# Patient Record
Sex: FEMALE | Race: WHITE | Employment: OTHER | URBAN - METROPOLITAN AREA
[De-identification: names, ages, dates, MRNs, and addresses within clinical notes are randomized per-mention and may not be internally consistent; named-entity substitution may affect disease eponyms.]

---

## 2017-10-23 ENCOUNTER — TELEPHONE (OUTPATIENT)
Dept: PULMONOLOGY | Age: 62
End: 2017-10-23

## 2018-03-22 ENCOUNTER — OFFICE VISIT (OUTPATIENT)
Dept: ORTHOPEDIC SURGERY | Age: 63
End: 2018-03-22

## 2018-03-22 VITALS — WEIGHT: 220 LBS | HEIGHT: 63 IN | BODY MASS INDEX: 38.98 KG/M2

## 2018-03-22 DIAGNOSIS — M17.11 PRIMARY OSTEOARTHRITIS OF RIGHT KNEE: Primary | ICD-10-CM

## 2018-03-22 PROCEDURE — 3017F COLORECTAL CA SCREEN DOC REV: CPT | Performed by: ORTHOPAEDIC SURGERY

## 2018-03-22 PROCEDURE — G8484 FLU IMMUNIZE NO ADMIN: HCPCS | Performed by: ORTHOPAEDIC SURGERY

## 2018-03-22 PROCEDURE — 3014F SCREEN MAMMO DOC REV: CPT | Performed by: ORTHOPAEDIC SURGERY

## 2018-03-22 PROCEDURE — G8417 CALC BMI ABV UP PARAM F/U: HCPCS | Performed by: ORTHOPAEDIC SURGERY

## 2018-03-22 PROCEDURE — G8427 DOCREV CUR MEDS BY ELIG CLIN: HCPCS | Performed by: ORTHOPAEDIC SURGERY

## 2018-03-22 PROCEDURE — 99243 OFF/OP CNSLTJ NEW/EST LOW 30: CPT | Performed by: ORTHOPAEDIC SURGERY

## 2018-03-22 NOTE — LETTER
that can be easily folded. Sleeves and pant legs should be loose enough to fit over bandages if necessary. ? Remove all make-up and your contact lenses, if you wear them. ? If applicable bring a protective case for contact lenses/eyeglasses, dentures and hearing aids. Insurance Information:     ? Our precert department will get authorization for your surgery if one is required. ? Please make sure we are notified of any insurance changes prior to your surgery. ? It is YOUR responsibility to know what your benefits are and whether a co-pay is required. It is recommended that you verify your benefits with your insurance company prior to surgery. Chase Hollingsworth           YOUR APPOINTMENT FOLLOWING SURGERY WILL BE:                             [facility]        [facility]      FOR         5/2/18  @ 11:40 AM                                  PHYSICAL THERAPY APPT:_________Antionette Carter____    *If you have any questions please call Galindo Lawrence [phone]

## 2018-03-22 NOTE — PROGRESS NOTES
therapy, NSAIDs, bracing, and activity modification were discussed. 2.  The indications for therapeutic injections were discussed. 3.  The indications for additional imaging studies were discussed. 4.  After considering the various options discussed, the patient elected to pursue a course that includes proceeding with a right total knee replacement      INFORMED CONSENT NOTE        We discussed the risks, benefits, and alternatives to the proposed procedure, as well as the necessity of other members of the healthcare team participating in the procedure. All questions were answered and the patient elected to proceed with the proposed procedure and signed the informed consent form.

## 2018-03-28 ENCOUNTER — HOSPITAL ENCOUNTER (OUTPATIENT)
Dept: OTHER | Age: 63
Discharge: HOME | End: 2018-03-28
Attending: ORTHOPAEDIC SURGERY | Admitting: ORTHOPAEDIC SURGERY

## 2018-03-28 LAB
ABO/RH: NORMAL
ANION GAP SERPL CALCULATED.3IONS-SCNC: 11 MMOL/L (ref 3–16)
ANTIBODY SCREEN: NORMAL
APTT: 27.4 SEC (ref 24.1–34.9)
BUN BLDV-MCNC: 14 MG/DL (ref 7–20)
CALCIUM SERPL-MCNC: 9.3 MG/DL (ref 8.3–10.6)
CHLORIDE BLD-SCNC: 100 MMOL/L (ref 99–110)
CO2: 32 MMOL/L (ref 21–32)
CREAT SERPL-MCNC: 0.9 MG/DL (ref 0.6–1.2)
EKG ATRIAL RATE: 54 BPM
EKG DIAGNOSIS: NORMAL
EKG P AXIS: 59 DEGREES
EKG P-R INTERVAL: 156 MS
EKG Q-T INTERVAL: 428 MS
EKG QRS DURATION: 82 MS
EKG QTC CALCULATION (BAZETT): 405 MS
EKG R AXIS: 4 DEGREES
EKG T AXIS: 39 DEGREES
EKG VENTRICULAR RATE: 54 BPM
GFR AFRICAN AMERICAN: >60
GFR NON-AFRICAN AMERICAN: >60
GLUCOSE BLD-MCNC: 95 MG/DL (ref 70–99)
HCT VFR BLD CALC: 43.1 % (ref 36–48)
HEMOGLOBIN: 14.4 G/DL (ref 12–16)
INR BLD: 1.04 (ref 0.85–1.15)
MCH RBC QN AUTO: 30.1 PG (ref 26–34)
MCHC RBC AUTO-ENTMCNC: 33.4 G/DL (ref 31–36)
MCV RBC AUTO: 90.1 FL (ref 80–100)
PDW BLD-RTO: 15.7 % (ref 12.4–15.4)
PLATELET # BLD: 273 K/UL (ref 135–450)
PMV BLD AUTO: 8.2 FL (ref 5–10.5)
POTASSIUM SERPL-SCNC: 3.6 MMOL/L (ref 3.5–5.1)
PROTHROMBIN TIME: 11.8 SEC (ref 9.6–13)
RBC # BLD: 4.79 M/UL (ref 4–5.2)
SODIUM BLD-SCNC: 143 MMOL/L (ref 136–145)
WBC # BLD: 7.3 K/UL (ref 4–11)

## 2018-03-28 PROCEDURE — 93010 ELECTROCARDIOGRAM REPORT: CPT | Performed by: INTERNAL MEDICINE

## 2018-03-29 LAB — URINE CULTURE, ROUTINE: NORMAL

## 2018-04-12 ENCOUNTER — TELEPHONE (OUTPATIENT)
Dept: ORTHOPEDIC SURGERY | Age: 63
End: 2018-04-12

## 2018-04-26 DIAGNOSIS — Z96.651 S/P TOTAL KNEE REPLACEMENT, RIGHT: Primary | ICD-10-CM

## 2018-04-26 RX ORDER — OXYCODONE HYDROCHLORIDE AND ACETAMINOPHEN 5; 325 MG/1; MG/1
1 TABLET ORAL EVERY 6 HOURS PRN
Qty: 28 TABLET | Refills: 0 | Status: SHIPPED | OUTPATIENT
Start: 2018-04-26 | End: 2018-05-02 | Stop reason: SDUPTHER

## 2018-05-02 ENCOUNTER — OFFICE VISIT (OUTPATIENT)
Dept: ORTHOPEDIC SURGERY | Age: 63
End: 2018-05-02

## 2018-05-02 DIAGNOSIS — Z96.651 S/P TOTAL KNEE REPLACEMENT, RIGHT: ICD-10-CM

## 2018-05-02 DIAGNOSIS — Z96.651 STATUS POST TOTAL RIGHT KNEE REPLACEMENT: Primary | ICD-10-CM

## 2018-05-02 DIAGNOSIS — M17.11 PRIMARY OSTEOARTHRITIS OF RIGHT KNEE: ICD-10-CM

## 2018-05-02 PROCEDURE — 99024 POSTOP FOLLOW-UP VISIT: CPT | Performed by: ORTHOPAEDIC SURGERY

## 2018-05-02 RX ORDER — OXYCODONE HYDROCHLORIDE AND ACETAMINOPHEN 5; 325 MG/1; MG/1
1 TABLET ORAL EVERY 6 HOURS PRN
Qty: 28 TABLET | Refills: 0 | Status: SHIPPED | OUTPATIENT
Start: 2018-05-02 | End: 2018-05-09

## 2018-05-10 DIAGNOSIS — Z96.651 STATUS POST TOTAL RIGHT KNEE REPLACEMENT: Primary | ICD-10-CM

## 2018-05-30 ENCOUNTER — OFFICE VISIT (OUTPATIENT)
Dept: ORTHOPEDIC SURGERY | Age: 63
End: 2018-05-30

## 2018-05-30 DIAGNOSIS — M17.11 PRIMARY OSTEOARTHRITIS OF RIGHT KNEE: ICD-10-CM

## 2018-05-30 DIAGNOSIS — Z96.651 STATUS POST TOTAL RIGHT KNEE REPLACEMENT: Primary | ICD-10-CM

## 2018-05-30 PROCEDURE — 99024 POSTOP FOLLOW-UP VISIT: CPT | Performed by: ORTHOPAEDIC SURGERY

## 2018-06-20 ENCOUNTER — OFFICE VISIT (OUTPATIENT)
Dept: ORTHOPEDIC SURGERY | Age: 63
End: 2018-06-20

## 2018-06-20 VITALS
BODY MASS INDEX: 38.98 KG/M2 | HEIGHT: 63 IN | DIASTOLIC BLOOD PRESSURE: 80 MMHG | SYSTOLIC BLOOD PRESSURE: 123 MMHG | WEIGHT: 220 LBS | HEART RATE: 94 BPM

## 2018-06-20 DIAGNOSIS — R52 PAIN: ICD-10-CM

## 2018-06-20 DIAGNOSIS — M54.16 LUMBAR RADICULOPATHY: Primary | ICD-10-CM

## 2018-06-20 PROCEDURE — 1036F TOBACCO NON-USER: CPT | Performed by: PHYSICIAN ASSISTANT

## 2018-06-20 PROCEDURE — G8427 DOCREV CUR MEDS BY ELIG CLIN: HCPCS | Performed by: PHYSICIAN ASSISTANT

## 2018-06-20 PROCEDURE — G8417 CALC BMI ABV UP PARAM F/U: HCPCS | Performed by: PHYSICIAN ASSISTANT

## 2018-06-20 PROCEDURE — 99213 OFFICE O/P EST LOW 20 MIN: CPT | Performed by: PHYSICIAN ASSISTANT

## 2018-06-20 PROCEDURE — 3017F COLORECTAL CA SCREEN DOC REV: CPT | Performed by: PHYSICIAN ASSISTANT

## 2018-06-20 RX ORDER — METHYLPREDNISOLONE 4 MG/1
TABLET ORAL
Qty: 1 KIT | Refills: 0 | Status: SHIPPED | OUTPATIENT
Start: 2018-06-20 | End: 2018-06-26

## 2018-06-20 RX ORDER — PREDNISONE 10 MG/1
TABLET ORAL
Qty: 35 TABLET | Refills: 0 | Status: SHIPPED | OUTPATIENT
Start: 2018-06-20 | End: 2019-04-01 | Stop reason: ALTCHOICE

## 2018-07-02 ENCOUNTER — OFFICE VISIT (OUTPATIENT)
Dept: ORTHOPEDIC SURGERY | Age: 63
End: 2018-07-02

## 2018-07-02 VITALS
HEART RATE: 73 BPM | WEIGHT: 220 LBS | HEIGHT: 55 IN | SYSTOLIC BLOOD PRESSURE: 157 MMHG | DIASTOLIC BLOOD PRESSURE: 93 MMHG | BODY MASS INDEX: 50.91 KG/M2

## 2018-07-02 DIAGNOSIS — M51.36 DDD (DEGENERATIVE DISC DISEASE), LUMBAR: ICD-10-CM

## 2018-07-02 DIAGNOSIS — M51.26 HNP (HERNIATED NUCLEUS PULPOSUS), LUMBAR: Primary | ICD-10-CM

## 2018-07-02 PROCEDURE — G8417 CALC BMI ABV UP PARAM F/U: HCPCS | Performed by: PHYSICIAN ASSISTANT

## 2018-07-02 PROCEDURE — 99213 OFFICE O/P EST LOW 20 MIN: CPT | Performed by: PHYSICIAN ASSISTANT

## 2018-07-02 PROCEDURE — G8427 DOCREV CUR MEDS BY ELIG CLIN: HCPCS | Performed by: PHYSICIAN ASSISTANT

## 2018-07-02 PROCEDURE — 1036F TOBACCO NON-USER: CPT | Performed by: PHYSICIAN ASSISTANT

## 2018-07-02 PROCEDURE — 3017F COLORECTAL CA SCREEN DOC REV: CPT | Performed by: PHYSICIAN ASSISTANT

## 2018-07-02 RX ORDER — MELOXICAM 15 MG/1
TABLET ORAL
Qty: 30 TABLET | Refills: 1 | Status: ON HOLD | OUTPATIENT
Start: 2018-07-02 | End: 2019-05-08 | Stop reason: HOSPADM

## 2018-07-02 NOTE — PROGRESS NOTES
thirst, urination, heat/cold  RESPIRATORY: Denies current dyspnea, cough  GI: Denies nausea, vomiting, diarrhea   : Denies bowel or bladder issues       PHYSICAL EXAM:    Vitals: Blood pressure (!) 157/93, pulse 73, height (!) 5.3\" (0.135 m), weight 220 lb (99.8 kg), last menstrual period 05/05/2006. GENERAL EXAM:  · General Apparence: Patient is adequately groomed with no evidence of malnutrition. · Orientation: The patient is oriented to time, place and person. · Mood & Affect:The patient's mood and affect are appropriate   · Lymphatic: The lymphatic examination bilaterally reveals all areas to be without enlargement or induration  · Sensation: Sensation is intact without deficit    LUMBAR/SACRAL EXAMINATION:  · Inspection: Local inspection shows no step-off or bruising. Lumbar alignment is normal.  Sagittal and Coronal balance is neutral.      · Palpation:   No evidence of tenderness at the midline. No tenderness bilaterally at the paraspinal or trochanters. There is no step-off or paraspinal spasm. · Range of Motion: Intact flexion, moderate severely limited extension extension reproduces pain  · Strength:   Strength testing is 5/5 in all muscle groups tested. · Special Tests:   Straight leg raise and crossed SLR negative. Leg length and pelvis level.  0 out of 5 Pee's signs. · Skin: There are no rashes, ulcerations or lesions. · Reflexes: Reflexes are symmetrically 2+ at the patellar and ankle tendons; left patellar 1+. Clonus absent bilaterally at the feet. · Gait & station: Forward flexed with walker  · Additional Examinations: +tenderness to both SI joints  · RIGHT LOWER EXTREMITY: Inspection/examination of the right lower extremity does not show any tenderness, deformity or injury. Range of motion is full. There is no gross instability. There are no rashes, ulcerations or lesions.  Strength and tone are normal.  · LEFT LOWER EXTREMITY:  Inspection/examination of the left lower extremity does not show any tenderness, deformity or injury. Range of motion is full. There is no gross instability. There are no rashes, ulcerations or lesions. Strength and tone are normal.    Diagnostic Testing:    Lumbar MRI report & scan reviewed from [facility] 6/29/2018 showing L4 5 spondylolisthesis, large left foraminal extrusion L3 4, disc bulging with bilateral foraminal stenosis L5-S1      2 views of the left hip including AP pelvis and lateral and straight some moderate osteoarthritis of the hip without signs of any AVN or fractures. No pelvic abnormalities.         Impression:  1) 1mo LB/buttock pain, contributing sacroiliitis  2) Large left L3-4 foraminal extrusion, biforaminal stenosis L5-S1  3) Moderate left hip OA--Jagdish Dickinson PA-C  4) S/p B TKR  5) Sarcoidosis       Plan:   1) PT for above  2) Mobic 15mg I po qd PRN after steroid  3) Cont Gabapentin   4) F/u 4-6wks after PT, discussed BA if no improvement         [facility]

## 2018-08-01 ENCOUNTER — OFFICE VISIT (OUTPATIENT)
Dept: ORTHOPEDIC SURGERY | Age: 63
End: 2018-08-01

## 2018-08-01 VITALS
WEIGHT: 220 LBS | HEIGHT: 63 IN | HEART RATE: 58 BPM | SYSTOLIC BLOOD PRESSURE: 155 MMHG | BODY MASS INDEX: 38.98 KG/M2 | DIASTOLIC BLOOD PRESSURE: 96 MMHG

## 2018-08-01 DIAGNOSIS — M54.16 LUMBAR RADICULOPATHY: Primary | ICD-10-CM

## 2018-08-01 PROCEDURE — 3017F COLORECTAL CA SCREEN DOC REV: CPT | Performed by: PHYSICAL MEDICINE & REHABILITATION

## 2018-08-01 PROCEDURE — 1036F TOBACCO NON-USER: CPT | Performed by: PHYSICAL MEDICINE & REHABILITATION

## 2018-08-01 PROCEDURE — 99214 OFFICE O/P EST MOD 30 MIN: CPT | Performed by: PHYSICAL MEDICINE & REHABILITATION

## 2018-08-01 PROCEDURE — G8427 DOCREV CUR MEDS BY ELIG CLIN: HCPCS | Performed by: PHYSICAL MEDICINE & REHABILITATION

## 2018-08-01 PROCEDURE — G8417 CALC BMI ABV UP PARAM F/U: HCPCS | Performed by: PHYSICAL MEDICINE & REHABILITATION

## 2018-08-01 NOTE — LETTER
[address] [place] [address] [address] and Sports Medicine    Please Schedule the following with: Dr. Moisés Coley    Date:  08/01/18     Patient: Fuentes Wade     YOB: 1955    Patient Home Phone: [phone] (home)    Diagnosis: Left L3 4 foraminal HNP, I foraminal stenosis L5-S1    [x]LT     []RT     []EVIN     []Midline    Levels: Left L5-S1 interlaminar epidural, #1  []Cervical BA    []L-MBB  []SI Joint    []C-FACET  []L-FACET    []Interlaminar BA     []HIP     []C-MBB  []Transforaminal BA   []Neurotomy    Attending Physician: Gomez Watkins    Injection Schedule for: At: [facility]    First Insurance:                               Pre-cert #:  Second Insurance:                 Pre-cert #:    Comments:        [] Blood Thinner:                 []Diabetic           []Antibiotic:               []Glaucoma:    [] Current Open Wounds, Lacerations or Sores     Allergies: Allergies   Allergen Reactions    Plaquenil [Hydroxychloroquine Sulfate] Shortness Of Breath    Demerol Other (See Comments)     HALLUCINATE       Past Medical History:   Diagnosis Date    Anemia     Arthritis     [facility] [address]     Hypertension     Lung disease     Mediastinal lymphadenopathy     Sarcoidosis ([facility] [address]           Current Outpatient Prescriptions:     meloxicam (MOBIC) 15 MG tablet, I po qd PRN, Disp: 30 tablet, Rfl: 1    predniSONE (DELTASONE) 10 MG tablet, ONE TID FOR ONE WEEK , THEN ONE BID FOR ONE WEEK, THEN START MEDROL DOSE SHERITA, Disp: 35 tablet, Rfl: 0    aspirin 325 MG EC tablet, Take 1 tablet by mouth 2 times daily, Disp: 60 tablet, Rfl: 0    sertraline (ZOLOFT) 25 MG tablet, Take 25 mg by mouth daily, Disp: , Rfl:     gabapentin (NEURONTIN) 300 MG capsule, Take 300 mg by mouth 3 times daily, Disp: , Rfl:     hydrochlorothiazide (MICROZIDE) 12.5 MG capsule, Take 12.5 mg by mouth daily. , Disp: , Rfl:     predniSONE (DELTASONE) 10 MG tablet, Take 5 mg by mouth daily , Disp: , Rfl:     methotrexate 2.5 MG tablet, Take 1 tablet by mouth once a week. Take 4 tablets by mouth ounce weekly. , Disp: 48 tablet, Rfl: 3    folic acid (FOLVITE) 1 MG tablet, Take 1 tablet by mouth daily. , Disp: 90 tablet, Rfl: 3    omeprazole (PRILOSEC) 20 MG capsule, Take 20 mg by mouth.  Indications: pt states when needed, Disp: , Rfl:

## 2018-08-01 NOTE — PROGRESS NOTES
New Patient: SPINE    CHIEF COMPLAINT:    Chief Complaint   Patient presents with    Back Pain     fu back doing a little better, therapy helping       HISTORY OF PRESENT ILLNESS:                The patient is a 57 y.o. female h/o sarcoidosis, HTN referred by Wicho Ornelas PA-C for a 8 week h/o Atraumatic aching low back and left buttock pain    Now some relief after 6 visits of therapy but still pain in her back left buttock occasionally referred to her hips and numbness and tingling in the lateral calves bilaterally. Worse standing and walking short distances summer with sitting but incomplete. No progressive weakness    Pain Assessment  Location of Pain: Back  Severity of Pain: 5  Quality of Pain: Aching  Duration of Pain: Persistent  Frequency of Pain: Constant  Aggravating Factors: Standing, Walking  Limiting Behavior: Yes  Relieving Factors: Rest  Result of Injury: No  Work-Related Injury: No  Are there other pain locations you wish to document?: No    The pain assessment was noted & reviewed in the medical record today.      Current/Past Treatment:   · Physical Therapy: no  · Chiropractic:   no  · Injection:   no  · Medications: recent:  NSAIDs, Prednisone, MDP, Percocet, Gabapentin 300mg QID    · Surgery/Consult:  no    Past Medical History: Medical history form was reviewed today & scanned into the media tab  Past Medical History:   Diagnosis Date    Anemia     Arthritis     [facility]asis ([facility] [address]     Hypertension     Lung disease     Mediastinal lymphadenopathy     Sarcoidosis ([facility] [address]       Past Surgical History:     Past Surgical History:   Procedure Laterality Date    APPENDECTOMY      CARPAL TUNNEL RELEASE      JOINT REPLACEMENT      LEFT KNEE    OTHER SURGICAL HISTORY  04/16/2018    right total knee replacement    ROTATOR CUFF REPAIR      RIGHT     Current Medications:     Current Outpatient Prescriptions:     meloxicam (MOBIC) 15 MG tablet, I po qd PRN, Disp: 30 tablet, Rfl: 1    predniSONE PHYSICAL EXAM:    Vitals: Blood pressure (!) 155/96, pulse 58, height 5' 3\" (1.6 m), weight 220 lb (99.8 kg), last menstrual period 05/05/2006. GENERAL EXAM:  · General Apparence: Patient is adequately groomed with no evidence of malnutrition. · Orientation: The patient is oriented to time, place and person. · Mood & Affect:The patient's mood and affect are appropriate   · Lymphatic: The lymphatic examination bilaterally reveals all areas to be without enlargement or induration  · Sensation: Sensation is intact without deficit    LUMBAR/SACRAL EXAMINATION:  · Inspection: Local inspection shows no step-off or bruising. Lumbar alignment is normal.  Sagittal and Coronal balance is neutral.      · Palpation:   No evidence of tenderness at the midline. No tenderness bilaterally at the paraspinal or trochanters. There is no step-off or paraspinal spasm. · Range of Motion: Flexion to 30-40° extension 0°   · Strength:   Strength testing is 5/5 in all muscle groups tested. · Special Tests:   Straight leg raise and crossed SLR negative. Leg length and pelvis level.  0 out of 5 Pee's signs. · Skin: There are no rashes, ulcerations or lesions. · Reflexes: Reflexes are symmetrically trace at the lower extremities Gait & station: Forward flexed with walker  · Additional Examinations:   · RIGHT LOWER EXTREMITY: Inspection/examination of the right lower extremity does not show any tenderness, deformity or injury. Range of motion is full. There is no gross instability. There are no rashes, ulcerations or lesions. Strength and tone are normal.  · LEFT LOWER EXTREMITY:  Inspection/examination of the left lower extremity does not show any tenderness, deformity or injury. Range of motion is full. There is no gross instability. There are no rashes, ulcerations or lesions.   Strength and tone are normal.    Diagnostic Testing:    Lumbar MRI report & scan reviewed from [facility] 6/29/2018 showing L4 5 spondylolisthesis, large left foraminal extrusion L3 4, disc bulging with bilateral foraminal stenosis L5-S1      2 views of the left hip including AP pelvis and lateral and straight some moderate osteoarthritis of the hip without signs of any AVN or fractures. No pelvic abnormalities.         Impression:  1)2mo LB/buttock pain, contributing sacroiliitis  2) Large left L3-4 foraminal extrusion, biforaminal stenosis L5-S1  3) Moderate left hip OA--Jagdish Dickinson PA-C  4) S/p B TKR  5) Sarcoidosis       Plan:     D/c PT  Left L5-S1 interlaminar epidural, #1    Can consider more focal left L3 4 transforaminal not improved    She may be symptomatic from both the bi-foraminal stenosis L5-S1 as well as the disc herniation L3 4      [address]

## 2018-08-17 ENCOUNTER — TELEPHONE (OUTPATIENT)
Dept: ORTHOPEDIC SURGERY | Age: 63
End: 2018-08-17

## 2018-08-17 NOTE — TELEPHONE ENCOUNTER
DOS   8/24/18  CPT   58975  DX   M51.26   M47.817  OP SX AUTH   [id number]  VALID   8/6/18 - 10/6/18   PER   FAX  LEFT   LEVELS   L5 - S1   PROCEDURE   EPIDURAL INJECTION  DR. Keenan [facility]  INSURANCE:   [facility]

## 2018-08-24 ENCOUNTER — HOSPITAL ENCOUNTER (OUTPATIENT)
Age: 63
Setting detail: OUTPATIENT SURGERY
Discharge: HOME | End: 2018-08-24
Attending: PHYSICAL MEDICINE & REHABILITATION | Admitting: PHYSICAL MEDICINE & REHABILITATION
Payer: COMMERCIAL

## 2018-08-24 VITALS
WEIGHT: 220 LBS | HEART RATE: 59 BPM | BODY MASS INDEX: 37.56 KG/M2 | SYSTOLIC BLOOD PRESSURE: 160 MMHG | RESPIRATION RATE: 16 BRPM | OXYGEN SATURATION: 100 % | TEMPERATURE: 98 F | DIASTOLIC BLOOD PRESSURE: 85 MMHG | HEIGHT: 64 IN

## 2018-08-24 PROCEDURE — 7100000010 HC PHASE II RECOVERY - FIRST 15 MIN: Performed by: PHYSICAL MEDICINE & REHABILITATION

## 2018-08-24 PROCEDURE — 2709999900 HC NON-CHARGEABLE SUPPLY: Performed by: PHYSICAL MEDICINE & REHABILITATION

## 2018-08-24 PROCEDURE — 2500000003 HC RX 250 WO HCPCS: Performed by: PHYSICAL MEDICINE & REHABILITATION

## 2018-08-24 PROCEDURE — 7100000011 HC PHASE II RECOVERY - ADDTL 15 MIN: Performed by: PHYSICAL MEDICINE & REHABILITATION

## 2018-08-24 PROCEDURE — 3600000002 HC SURGERY LEVEL 2 BASE: Performed by: PHYSICAL MEDICINE & REHABILITATION

## 2018-08-24 PROCEDURE — 6360000004 HC RX CONTRAST MEDICATION: Performed by: PHYSICAL MEDICINE & REHABILITATION

## 2018-08-24 PROCEDURE — 6360000002 HC RX W HCPCS: Performed by: PHYSICAL MEDICINE & REHABILITATION

## 2018-08-24 ASSESSMENT — PAIN DESCRIPTION - DESCRIPTORS: DESCRIPTORS: NUMBNESS;TINGLING

## 2018-08-24 ASSESSMENT — PAIN - FUNCTIONAL ASSESSMENT: PAIN_FUNCTIONAL_ASSESSMENT: 0-10

## 2018-09-13 ENCOUNTER — OFFICE VISIT (OUTPATIENT)
Dept: ORTHOPEDIC SURGERY | Age: 63
End: 2018-09-13

## 2018-09-13 VITALS
BODY MASS INDEX: 50.91 KG/M2 | DIASTOLIC BLOOD PRESSURE: 84 MMHG | HEIGHT: 55 IN | SYSTOLIC BLOOD PRESSURE: 131 MMHG | WEIGHT: 220 LBS | HEART RATE: 86 BPM

## 2018-09-13 DIAGNOSIS — M51.26 HNP (HERNIATED NUCLEUS PULPOSUS), LUMBAR: ICD-10-CM

## 2018-09-13 DIAGNOSIS — M51.36 DDD (DEGENERATIVE DISC DISEASE), LUMBAR: ICD-10-CM

## 2018-09-13 DIAGNOSIS — M54.16 LUMBAR RADICULOPATHY: Primary | ICD-10-CM

## 2018-09-13 PROCEDURE — 1036F TOBACCO NON-USER: CPT | Performed by: PHYSICIAN ASSISTANT

## 2018-09-13 PROCEDURE — G8427 DOCREV CUR MEDS BY ELIG CLIN: HCPCS | Performed by: PHYSICIAN ASSISTANT

## 2018-09-13 PROCEDURE — 99213 OFFICE O/P EST LOW 20 MIN: CPT | Performed by: PHYSICIAN ASSISTANT

## 2018-09-13 PROCEDURE — 3017F COLORECTAL CA SCREEN DOC REV: CPT | Performed by: PHYSICIAN ASSISTANT

## 2018-09-13 PROCEDURE — G8417 CALC BMI ABV UP PARAM F/U: HCPCS | Performed by: PHYSICIAN ASSISTANT

## 2018-09-13 NOTE — PROGRESS NOTES
Follow up injection: SPINE    CHIEF COMPLAINT:    Chief Complaint   Patient presents with    Back Pain     F/U BA injection       HISTORY OF PRESENT ILLNESS:                The patient is a 57 y.o. female h/o sarcoidosis, HTN referred by Wicho Ornelas PA-C for now a 3.5mo h/o atraumatic aching low back and left buttock pain. At times she will experience numbness in her left lateral thigh/calf. Pain is most bothersome. Symptoms are increased with any prolonged walking or standing. Relief with sitting and forward flexion. She reports 50% improvement with recent BA. Other conservative care includes NSAIDs, prednisone, MDP, muscle relaxers, Percocet, gabapentin 300 QID, PT. Denies any progressive extremity weakness or recent bowel or bladder changes. No side effects the procedure    Pain Assessment  Location of Pain: Back  Severity of Pain: 6  Quality of Pain: Sharp, Dull, Aching  Duration of Pain: Persistent  Frequency of Pain: Constant  Aggravating Factors: Stairs, Walking, Standing, Kneeling, Squatting, Straightening, Exercise, Stretching, Bending  Limiting Behavior: Yes  Relieving Factors: Rest  Result of Injury: No  Work-Related Injury: No  Are there other pain locations you wish to document?: No    The pain assessment was noted & reviewed in the medical record today.      Current/Past Treatment:   · Physical Therapy: YES  · Chiropractic:   no  · Injection: 8/24/18 Left L5/S1 LESI #1--50%   · Medications: NSAIDs, Prednisone, MDP, Percocet, Gabapentin 300mg QID    · Surgery/Consult:  no    Past Medical History: Medical history form was reviewed today & scanned into the media tab  Past Medical History:   Diagnosis Date    Anemia     Arthritis     Bron[facility]ctasis ([facility] [address]     Hypertension     Lung disease     Mediastinal lymphadenopathy     Sarcoidosis       Past Surgical History:     Past Surgical History:   Procedure Laterality Date    APPENDECTOMY      CARPAL TUNNEL RELEASE      JOINT REPLACEMENT LEFT KNEE    OTHER SURGICAL HISTORY  04/16/2018    right total knee replacement    VT NJX DX/THER SBST INTRLMNR CRV/THRC W/IMG GDN Left 8/24/2018    LEFT LUMBAR FIVE SACRAL ONE EPIDURAL STEROID INJECTION SITE CONFIRMED BY FLUOROSCOPY performed by Dang Sommers MD at [facility]      RIGHT     Current Medications:     Current Outpatient Prescriptions:     meloxicam (MOBIC) 15 MG tablet, I po qd PRN, Disp: 30 tablet, Rfl: 1    predniSONE (DELTASONE) 10 MG tablet, ONE TID FOR ONE WEEK , THEN ONE BID FOR ONE WEEK, THEN START MEDROL DOSE SHERITA, Disp: 35 tablet, Rfl: 0    sertraline (ZOLOFT) 25 MG tablet, Take 25 mg by mouth daily, Disp: , Rfl:     gabapentin (NEURONTIN) 300 MG capsule, Take 300 mg by mouth 3 times daily, Disp: , Rfl:     hydrochlorothiazide (MICROZIDE) 12.5 MG capsule, Take 12.5 mg by mouth daily. , Disp: , Rfl:     predniSONE (DELTASONE) 10 MG tablet, Take 5 mg by mouth daily , Disp: , Rfl:     methotrexate 2.5 MG tablet, Take 1 tablet by mouth once a week. Take 4 tablets by mouth ounce weekly. , Disp: 48 tablet, Rfl: 3    folic acid (FOLVITE) 1 MG tablet, Take 1 tablet by mouth daily. , Disp: 90 tablet, Rfl: 3    omeprazole (PRILOSEC) 20 MG capsule, Take 20 mg by mouth. Indications: pt states when needed, Disp: , Rfl:     aspirin 325 MG EC tablet, Take 1 tablet by mouth 2 times daily, Disp: 60 tablet, Rfl: 0  Allergies:  Plaquenil [hydroxychloroquine sulfate] and Demerol  Social History:    reports that she has never smoked. She has never used smokeless tobacco. She reports that she does not drink alcohol or use drugs.   Family History:   Family History   Problem Relation Age of Onset    Cancer Mother         Lymphoma    Cancer Brother         Lymphoma    Asthma Neg Hx     Diabetes Neg Hx     Heart Failure Neg Hx     Emphysema Neg Hx     Hypertension Neg Hx        REVIEW OF SYSTEMS: Full ROS noted & scanned   CONSTITUTIONAL: Denies unexplained weight loss, fevers, chills or fatigue  NEUROLOGICAL: Denies unsteady gait or progressive weakness      PHYSICAL EXAM:    Vitals: Blood pressure 131/84, pulse 86, height (!) 5.3\" (0.135 m), weight 220 lb (99.8 kg), last menstrual period 05/05/2006. GENERAL EXAM:  · General Apparence: Patient is adequately groomed with no evidence of malnutrition. · Orientation: The patient is oriented to time, place and person. · Mood & Affect:The patient's mood and affect are appropriate   · Lymphatic: The lymphatic examination bilaterally reveals all areas to be without enlargement or induration  · Sensation: Sensation is intact without deficit    LUMBAR/SACRAL EXAMINATION:  · Inspection: Local inspection shows no step-off or bruising. Lumbar alignment is normal.  Sagittal and Coronal balance is neutral.      · Palpation:   No evidence of tenderness at the midline. No tenderness bilaterally at the paraspinal or trochanters. There is no step-off or paraspinal spasm. · Range of Motion: 45° of flexion, 10° extension  · Strength:   Strength testing is 5/5 in all muscle groups tested. · Special Tests:   Straight leg raise and crossed SLR negative. Leg length and pelvis level.  0 out of 5 Pee's signs. · Skin: There are no rashes, ulcerations or lesions. · Reflexes: Reflexes are symmetrically 2+ at the patellar and ankle tendons; left patellar 1+. Clonus absent bilaterally at the feet. · Gait & station: Forward flexed with walker  · Additional Examinations: +tenderness to L SI joint  · RIGHT LOWER EXTREMITY: Inspection/examination of the right lower extremity does not show any tenderness, deformity or injury. Range of motion is full. There is no gross instability. There are no rashes, ulcerations or lesions. Strength and tone are normal.  · LEFT LOWER EXTREMITY:  Inspection/examination of the left lower extremity does not show any tenderness, deformity or injury. Range of motion is full.  There is no gross

## 2019-03-30 NOTE — OP NOTE
Jean Bustillo    OPERATIVE NOTE    Preoperative Diagnosis: Cataract right eye    Postoperative Diagnosis: Cataract right eye    Procedure: Phacoemulsification with intraocular lens inplantation, right eye    Surgeon: Promise Hernandez M.D., Ph.D. Anesthesia: MAC with topical    Complications: none    Specimens: none    Indications for procedure: The patient is a 70y.o. year old with decreased vision, glare and halos around lights, and trouble with activities of daily living. Examination revealed a visually significant cataract in the right eye. Risks, benefits, and alternatives to surgery were discussed with the patient and the patient elected to proceed with phacoemulsification with lens implantation. Details of the procedure: Following informed consent, the patient was taken to the operating room and placed in the supine position. The eye was prepped and draped in the usual sterile fashion using aseptic technique for cataract surgery. Following topical tetracaine drops a side port incision was made in the superotemporal cornea. The eye was filled with Trypan blue followed by balanced salt solution. The eye was filled with 1% non-preserved lidocaine. The eye was filled with viscoelastic and a 2.2 mm keratome blade was used to make a 3-plane clear corneal incision in the temporal cornea. The cystitome was used to make a tear in the anterior capsule and a Utrata forceps was used to make a complete curvilinear capsulorrhexis. The lens was hydrodissected and freely rotated. Phacoemulsification was performed. Irrigation/aspiration was used to remove all cortical material from the capsular bag. The eye was filled with viscoelastic and a foldable posterior chamber intraocular lens was injected into the capsular bag. Irrigation/aspiration was used to remove all excess viscoelastic. The eye was pressurized and the wounds were check for leaks and none were found.   The patient had Betadine and Alphagan solutions placed on the eye. The eye was covered with a metal shield. The patient went to the PACU in excellent condition, having tolerated the procedure extremely well, and will follow up with me tomorrow for postop day 1 care.     Rl James MD, PhD, FACS

## 2019-03-30 NOTE — H&P
The H&P was reviewed, the patient was examined, and no change has occurred in the patient's condition since the H&P was completed.     Julissa Florian MD, PhD, FACS

## 2019-04-01 NOTE — PROGRESS NOTES
PRE OP INSTRUCTION SHEET   1. Do not eat or drink anything after 12 midnight  prior to surgery. This includes no water, chewing gum or mints. 2. Take the following pills will a small sip of water (see MAR)                                        3. Aspirin, Ibuprofen, Advil, Naproxen, Vitamin E, fish oil and other Anti-inflammatory products should be stopped for 5 days before surgery or as directed by your physician. 4. Check with your Doctor regarding stopping Plavix, Coumadin, Lovenox, Fragmin or other blood thinners   5. Do not smoke, and do not drink any alcoholic beverages 24 hours prior to surgery. This includes NA Beer. 6. You may brush your teeth and gargle the morning of surgery. DO NOT SWALLOW WATER   7. You MUST make arrangements for a responsible adult to take you home after your surgery. You will not be allowed to leave alone or drive yourself home. It is strongly suggested someone stay with you the first 24 hrs. Your surgery will be cancelled if you do not have a ride home. 8. A parent/legal guardian must accompany a child scheduled for surgery and plan to stay at the hospital until the child is discharged. Please do not bring other children with you. 9. Please wear simple, loose fitting clothing to the hospital.  Lencho Cervantes not bring valuables (money, credit cards, checkbooks, etc.) Do not wear any makeup (including no eye makeup) or nail polish on your fingers or toes. 10. DO NOT wear any jewelry or piercings on day of surgery. All body piercing jewelry must be removed. 11. If you have dentures,glasses, or contacts they will be removed before going to the OR; we will provide you a container. 12. Please see your family doctor/and cardiologist for a history & physical and/or concerning medications. Bring any test results/reports from your physician's office. Have history and labs faxed to [phone].  Remember to bring Blood Bank bracelet on the day of surgery. 14. If you have a Living Will and Durable Power of  for Healthcare, please bring in a copy. 13. Notify your Surgeon if you develop any illness between now and surgery  time, cough, cold, fever, sore throat, nausea, vomiting, etc.  Please notify your surgeon if you experience dizziness, shortness of breath or blurred vision between now & the time of your surgery   16. DO NOT shave your operative site 96 hours prior to surgery. For face & neck surgery, men may use an electric razor 48 hours prior to surgery. 17. Shower with _x__Antibacterial soap (x_chlorhexidine for total joint  Pt's) shower two times before surgery.(the morning of and the night before. 18. To provide excellent care visitors will be limited to one in the room at any given time.   Please call pre admission testing if you any further questions [phone] or [phone]

## 2019-04-02 ENCOUNTER — ANESTHESIA EVENT (OUTPATIENT)
Dept: OPERATING ROOM | Age: 64
End: 2019-04-02
Payer: MEDICARE

## 2019-04-03 ENCOUNTER — HOSPITAL ENCOUNTER (OUTPATIENT)
Age: 64
Setting detail: OUTPATIENT SURGERY
Discharge: HOME | End: 2019-04-03
Attending: OPHTHALMOLOGY | Admitting: OPHTHALMOLOGY
Payer: MEDICARE

## 2019-04-03 ENCOUNTER — ANESTHESIA (OUTPATIENT)
Dept: OPERATING ROOM | Age: 64
End: 2019-04-03
Payer: MEDICARE

## 2019-04-03 VITALS
WEIGHT: 235 LBS | HEART RATE: 60 BPM | SYSTOLIC BLOOD PRESSURE: 131 MMHG | DIASTOLIC BLOOD PRESSURE: 73 MMHG | RESPIRATION RATE: 16 BRPM | OXYGEN SATURATION: 96 % | BODY MASS INDEX: 40.12 KG/M2 | HEIGHT: 64 IN | TEMPERATURE: 97.8 F

## 2019-04-03 VITALS — SYSTOLIC BLOOD PRESSURE: 147 MMHG | OXYGEN SATURATION: 100 % | DIASTOLIC BLOOD PRESSURE: 70 MMHG

## 2019-04-03 LAB
GLUCOSE BLD-MCNC: 127 MG/DL (ref 70–99)
PERFORMED ON: ABNORMAL

## 2019-04-03 PROCEDURE — 2500000003 HC RX 250 WO HCPCS: Performed by: OPHTHALMOLOGY

## 2019-04-03 PROCEDURE — 6370000000 HC RX 637 (ALT 250 FOR IP): Performed by: OPHTHALMOLOGY

## 2019-04-03 PROCEDURE — 3600000003 HC SURGERY LEVEL 3 BASE: Performed by: OPHTHALMOLOGY

## 2019-04-03 PROCEDURE — 2709999900 HC NON-CHARGEABLE SUPPLY: Performed by: OPHTHALMOLOGY

## 2019-04-03 PROCEDURE — V2632 POST CHMBR INTRAOCULAR LENS: HCPCS | Performed by: OPHTHALMOLOGY

## 2019-04-03 PROCEDURE — 2580000003 HC RX 258: Performed by: ANESTHESIOLOGY

## 2019-04-03 PROCEDURE — 7100000011 HC PHASE II RECOVERY - ADDTL 15 MIN: Performed by: OPHTHALMOLOGY

## 2019-04-03 PROCEDURE — 3700000000 HC ANESTHESIA ATTENDED CARE: Performed by: OPHTHALMOLOGY

## 2019-04-03 PROCEDURE — 6360000002 HC RX W HCPCS: Performed by: NURSE ANESTHETIST, CERTIFIED REGISTERED

## 2019-04-03 PROCEDURE — 7100000010 HC PHASE II RECOVERY - FIRST 15 MIN: Performed by: OPHTHALMOLOGY

## 2019-04-03 DEVICE — ACRYSOF(R) IQ ASPHERIC IOL SP ACRYLIC FOLDABLELENS WULTRASERT(TM) DELIVERY SYSTEM UV WBLUE LIGHT FILTER. 13.0MM LENGTH 6.0MM ANTERIORASYMMETRIC BICONVEX OPTIC PLANAR HAPTICS.
Type: IMPLANTABLE DEVICE | Site: EYE | Status: FUNCTIONAL
Brand: ACRYSOF ULTRASERT

## 2019-04-03 RX ORDER — SODIUM CHLORIDE, SODIUM LACTATE, POTASSIUM CHLORIDE, CALCIUM CHLORIDE 600; 310; 30; 20 MG/100ML; MG/100ML; MG/100ML; MG/100ML
INJECTION, SOLUTION INTRAVENOUS CONTINUOUS
Status: DISCONTINUED | OUTPATIENT
Start: 2019-04-03 | End: 2019-04-03 | Stop reason: HOSPADM

## 2019-04-03 RX ORDER — MOXIFLOXACIN 5 MG/ML
1 SOLUTION/ DROPS OPHTHALMIC SEE ADMIN INSTRUCTIONS
Status: DISCONTINUED | OUTPATIENT
Start: 2019-04-03 | End: 2019-04-03 | Stop reason: HOSPADM

## 2019-04-03 RX ORDER — SODIUM CHLORIDE 0.9 % (FLUSH) 0.9 %
10 SYRINGE (ML) INJECTION PRN
Status: DISCONTINUED | OUTPATIENT
Start: 2019-04-03 | End: 2019-04-03 | Stop reason: HOSPADM

## 2019-04-03 RX ORDER — FENTANYL CITRATE 50 UG/ML
INJECTION, SOLUTION INTRAMUSCULAR; INTRAVENOUS PRN
Status: DISCONTINUED | OUTPATIENT
Start: 2019-04-03 | End: 2019-04-03 | Stop reason: SDUPTHER

## 2019-04-03 RX ORDER — PREDNISOLONE ACETATE 10 MG/ML
1 SUSPENSION/ DROPS OPHTHALMIC SEE ADMIN INSTRUCTIONS
Status: DISCONTINUED | OUTPATIENT
Start: 2019-04-03 | End: 2019-04-03 | Stop reason: HOSPADM

## 2019-04-03 RX ORDER — MOXIFLOXACIN 5 MG/ML
SOLUTION/ DROPS OPHTHALMIC PRN
Status: DISCONTINUED | OUTPATIENT
Start: 2019-04-03 | End: 2019-04-03 | Stop reason: ALTCHOICE

## 2019-04-03 RX ORDER — LIDOCAINE HYDROCHLORIDE 10 MG/ML
1 INJECTION, SOLUTION EPIDURAL; INFILTRATION; INTRACAUDAL; PERINEURAL
Status: DISCONTINUED | OUTPATIENT
Start: 2019-04-03 | End: 2019-04-03 | Stop reason: HOSPADM

## 2019-04-03 RX ORDER — MIDAZOLAM HYDROCHLORIDE 1 MG/ML
INJECTION INTRAMUSCULAR; INTRAVENOUS PRN
Status: DISCONTINUED | OUTPATIENT
Start: 2019-04-03 | End: 2019-04-03 | Stop reason: SDUPTHER

## 2019-04-03 RX ORDER — TETRACAINE HYDROCHLORIDE 5 MG/ML
SOLUTION OPHTHALMIC PRN
Status: DISCONTINUED | OUTPATIENT
Start: 2019-04-03 | End: 2019-04-03 | Stop reason: ALTCHOICE

## 2019-04-03 RX ORDER — TRAMADOL HYDROCHLORIDE 50 MG/1
100 TABLET ORAL DAILY
Status: ON HOLD | COMMUNITY
End: 2019-05-08 | Stop reason: HOSPADM

## 2019-04-03 RX ORDER — SODIUM CHLORIDE 0.9 % (FLUSH) 0.9 %
10 SYRINGE (ML) INJECTION EVERY 12 HOURS SCHEDULED
Status: DISCONTINUED | OUTPATIENT
Start: 2019-04-03 | End: 2019-04-03 | Stop reason: HOSPADM

## 2019-04-03 RX ADMIN — Medication 0.3 ML: at 07:33

## 2019-04-03 RX ADMIN — SODIUM CHLORIDE, POTASSIUM CHLORIDE, SODIUM LACTATE AND CALCIUM CHLORIDE: 600; 310; 30; 20 INJECTION, SOLUTION INTRAVENOUS at 08:35

## 2019-04-03 RX ADMIN — FENTANYL CITRATE 25 MCG: 50 INJECTION INTRAMUSCULAR; INTRAVENOUS at 08:38

## 2019-04-03 RX ADMIN — Medication 0.3 ML: at 07:39

## 2019-04-03 RX ADMIN — Medication 0.3 ML: at 07:28

## 2019-04-03 RX ADMIN — MIDAZOLAM 2 MG: 1 INJECTION INTRAMUSCULAR; INTRAVENOUS at 08:38

## 2019-04-03 ASSESSMENT — PULMONARY FUNCTION TESTS
PIF_VALUE: 0

## 2019-04-03 ASSESSMENT — PAIN - FUNCTIONAL ASSESSMENT: PAIN_FUNCTIONAL_ASSESSMENT: 0-10

## 2019-04-03 NOTE — ANESTHESIA POSTPROCEDURE EVALUATION
Department of Anesthesiology  Postprocedure Note    Patient: Da Cole  MRN: [id number]  YOB: 1955  Date of evaluation: 4/3/2019  Time:  12:23 PM     Procedure Summary     Date:  04/03/19 Room / Location:  [place] [address] / [facility] OR    Anesthesia Start:  7601 Anesthesia Stop:  6795    Procedure:  PHACO EMULSIFICATION OF CATARACT WITH INTRAOCULAR LENS IMPLANT EYE (Right Eye) Diagnosis:  (CATARACT RIGHT EYE)    Surgeon:  Reuben Lafleur MD Responsible Provider:  Davi Whitney MD    Anesthesia Type:  MAC ASA Status:  3          Anesthesia Type: MAC    Salvador Phase I: Salvador Score: 10    Salvador Phase II: Salvador Score: 10    Last vitals: Reviewed and per EMR flowsheets.        Anesthesia Post Evaluation    Patient location during evaluation: PACU  Level of consciousness: awake and alert  Airway patency: patent  Nausea & Vomiting: no nausea and no vomiting  Complications: no  Cardiovascular status: blood pressure returned to baseline  Respiratory status: acceptable  Hydration status: euvolemic  Comments: Postoperative Anesthesia Note    Name:    Da Cole  MRN:      [id number]    Patient Vitals in the past 12 hrs:  04/03/19 0905, BP:131/73, Pulse:60, Resp:16, SpO2:96 %  04/03/19 0850, BP:131/68, Temp:97.8 °F (36.6 °C), Temp src:Temporal, Pulse:63, Resp:14  04/03/19 0719, BP:(!) 134/56, Temp:97.1 °F (36.2 °C), Temp src:Temporal, Pulse:59, Resp:16, SpO2:97 %     LABS:    CBC  Lab Results       Component                Value               Date/Time                  WBC                      8.9                 04/19/2018 05:06 AM        HGB                      11.1 (L)            04/19/2018 05:06 AM        HCT                      33.1 (L)            04/19/2018 05:06 AM        PLT                      172                 04/19/2018 05:06 AM   RENAL  Lab Results       Component                Value               Date/Time                  NA                       141                 04/19/2018 05:06 AM        K                        3.3 (L)             04/19/2018 05:06 AM        CL                       102                 04/19/2018 05:06 AM        CO2                      30                  04/19/2018 05:06 AM        BUN                      12                  04/19/2018 05:06 AM        CREATININE               0.7                 04/19/2018 05:06 AM        GLUCOSE                  127 (H)             04/19/2018 05:06 AM   COAGS  Lab Results       Component                Value               Date/Time                  PROTIME                  11.8                03/28/2018 09:44 AM        INR                      1.04                03/28/2018 09:44 AM        APTT                     27.4                03/28/2018 09:44 AM     Intake & Output:  @20AKHA@    Nausea & Vomiting:  No    Level of Consciousness:  Awake    Pain Assessment:  Adequate analgesia    Anesthesia Complications:  No apparent anesthetic complications    SUMMARY      Vital signs stable  OK to discharge from Stage I post anesthesia care.   Care transferred from Anesthesiology department on discharge from perioperative area

## 2019-04-03 NOTE — ANESTHESIA PRE PROCEDURE
Department of Anesthesiology  Preprocedure Note       Name:  Ana Le   Age:  70 y.o.  :  1955                                          MRN:  [id number]         Date:  4/3/2019      Surgeon: Av Castelan):  Taylor Turcios MD    Procedure: DR ABRIL GUZMAN JENNY [facility] EMULSIFICATION OF CATARACT WITH INTRAOCULAR LENS IMPLANT EYE (Right Eye)    Medications prior to admission:   Prior to Admission medications    Medication Sig Start Date End Date Taking? Authorizing Provider   traMADol (ULTRAM) 50 MG tablet Take 100 mg by mouth daily. Yes Historical Provider, MD   meloxicam (MOBIC) 15 MG tablet I po qd PRN 18  Yes Malathi Hyman PA-C   aspirin 325 MG EC tablet Take 1 tablet by mouth 2 times daily  Patient taking differently: Take 81 mg by mouth daily  18 Yes WYATT Saul CNP   sertraline (ZOLOFT) 25 MG tablet Take 25 mg by mouth daily   Yes Historical Provider, MD   gabapentin (NEURONTIN) 300 MG capsule Take 300 mg by mouth 3 times daily   Yes Historical Provider, MD   hydrochlorothiazide (MICROZIDE) 12.5 MG capsule Take 12.5 mg by mouth daily. Yes Historical Provider, MD   predniSONE (DELTASONE) 10 MG tablet Take 5 mg by mouth daily    Yes Historical Provider, MD   methotrexate 2.5 MG tablet Take 1 tablet by mouth once a week. Take 4 tablets by mouth ounce weekly. 11  Yes Ilene Moya MD   folic acid (FOLVITE) 1 MG tablet Take 1 tablet by mouth daily. 11  Yes Ilene Moya MD   omeprazole (PRILOSEC) 20 MG capsule Take 20 mg by mouth.  Indications: pt states when needed   Yes Historical Provider, MD       Current medications:    Current Facility-Administered Medications   Medication Dose Route Frequency Provider Last Rate Last Dose    moxifloxacin (VIGAMOX) 0.5 % ophthalmic solution 1 drop  1 drop Ophthalmic See Admin Instructions Taylor Turcios MD        prednisoLONE acetate (PRED FORTE) 1 % ophthalmic suspension 1 drop  1 drop Ophthalmic See Severiano Huxley, MD  epinephrine and lidocaine 2% PF in BSS injection (1 mL)   Intraocular Once Nga Barboza MD        lactated ringers infusion   Intravenous Continuous Dav Kimbrough MD        sodium chloride flush 0.9 % injection 10 mL  10 mL Intravenous 2 times per day Dav Kimbrough MD        sodium chloride flush 0.9 % injection 10 mL  10 mL Intravenous PRN Dav Kimbrough MD        lidocaine PF 1 % injection 1 mL  1 mL Intradermal Once PRN Dav Kimbrough MD           Allergies:     Allergies   Allergen Reactions    Plaquenil [Hydroxychloroquine Sulfate] Shortness Of Breath    Demerol Other (See Comments)     HALLUCINATE       Problem List:    Patient Active Problem List   Diagnosis Code    Pulmonary sarcoidosis ([facility] D86.0    RA (rheumatoid arthritis) ([facility] M06.9    Primary osteoarthritis of right knee M17.11    Status post total right knee replacement Z96.651    Lumbar radiculopathy M54.16       Past Medical History:        Diagnosis Date    Anemia     Arthritis     Bronchiectasis ([facility]     Diabetes mellitus ([facility]     Hypertension     Lung disease     Mediastinal lymphadenopathy     Sarcoidosis        Past Surgical History:        Procedure Laterality Date    APPENDECTOMY      CARPAL TUNNEL RELEASE      JOINT REPLACEMENT      LEFT KNEE    OTHER SURGICAL HISTORY  04/16/2018    right total knee replacement    LA NJX DX/THER SBST INTRLMNR CRV/THRC W/IMG GDN Left 8/24/2018    LEFT LUMBAR FIVE SACRAL ONE EPIDURAL STEROID INJECTION SITE CONFIRMED BY FLUOROSCOPY performed by Lauren Garcia MD at [address] [place] [address]       Social History:    Social History     Tobacco Use    Smoking status: Never Smoker    Smokeless tobacco: Never Used   Substance Use Topics    Alcohol use: No     Alcohol/week: 0.0 oz                                Counseling given: Not Answered      Vital Signs (Current):   Vitals:    04/01/19 1216 04/03/19 0719   BP:  (!) 134/56

## 2019-04-16 RX ORDER — LISINOPRIL 10 MG/1
10 TABLET ORAL DAILY
COMMUNITY

## 2019-04-16 RX ORDER — METFORMIN HYDROCHLORIDE 500 MG/1
500 TABLET, EXTENDED RELEASE ORAL
COMMUNITY

## 2019-04-16 NOTE — PRE-PROCEDURE INSTRUCTIONS

## 2019-04-18 ENCOUNTER — OFFICE VISIT (OUTPATIENT)
Dept: ORTHOPEDIC SURGERY | Age: 64
End: 2019-04-18
Payer: MEDICARE

## 2019-04-18 VITALS — HEIGHT: 64 IN | WEIGHT: 235.01 LBS | BODY MASS INDEX: 40.12 KG/M2

## 2019-04-18 DIAGNOSIS — M25.512 LEFT SHOULDER PAIN, UNSPECIFIED CHRONICITY: Primary | ICD-10-CM

## 2019-04-18 DIAGNOSIS — M75.102 TEAR OF LEFT ROTATOR CUFF, UNSPECIFIED TEAR EXTENT: ICD-10-CM

## 2019-04-18 PROCEDURE — 99213 OFFICE O/P EST LOW 20 MIN: CPT | Performed by: ORTHOPAEDIC SURGERY

## 2019-04-18 PROCEDURE — 1090F PRES/ABSN URINE INCON ASSESS: CPT | Performed by: ORTHOPAEDIC SURGERY

## 2019-04-18 PROCEDURE — G8427 DOCREV CUR MEDS BY ELIG CLIN: HCPCS | Performed by: ORTHOPAEDIC SURGERY

## 2019-04-18 PROCEDURE — G8400 PT W/DXA NO RESULTS DOC: HCPCS | Performed by: ORTHOPAEDIC SURGERY

## 2019-04-18 PROCEDURE — 3017F COLORECTAL CA SCREEN DOC REV: CPT | Performed by: ORTHOPAEDIC SURGERY

## 2019-04-18 PROCEDURE — G8417 CALC BMI ABV UP PARAM F/U: HCPCS | Performed by: ORTHOPAEDIC SURGERY

## 2019-04-18 PROCEDURE — 1123F ACP DISCUSS/DSCN MKR DOCD: CPT | Performed by: ORTHOPAEDIC SURGERY

## 2019-04-18 PROCEDURE — 1036F TOBACCO NON-USER: CPT | Performed by: ORTHOPAEDIC SURGERY

## 2019-04-18 PROCEDURE — 4040F PNEUMOC VAC/ADMIN/RCVD: CPT | Performed by: ORTHOPAEDIC SURGERY

## 2019-04-18 NOTE — PROGRESS NOTES
Chief Complaint:  Shoulder Pain (NP left shoulder pain)      History of Present Illness:  Chuck Ornelas is a  70 y.o. right hand dominant female here regarding left shoulder pain, this began 3 weeks ago while trimming trees, she was unable to finish secondary to pain and weakness. 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 She has occasional numbness and tingling down the arm, this is been ongoing for many months and not related to her current shoulder pain. She denies neck pain. She denies smoking, recently diagnosed with diabetes and takes metformin.          Pain Assessment:  Pain Assessment  Location of Pain: Shoulder  Location Modifiers: Left  Severity of Pain: 7  Quality of Pain: Aching  Duration of Pain: Persistent  Frequency of Pain: Intermittent  Aggravating Factors: (gen use)  Limiting Behavior: Yes  Result of Injury: No  Work-Related Injury: No  Are there other pain locations you wish to document?: No    Medical History:  Past Medical History:   Diagnosis Date    Anemia     Arthritis     Bronchiectasis (HCC)     Diabetes mellitus (HCC)     Hypertension     Lung disease     Mediastinal lymphadenopathy     Sarcoidosis      Past Surgical History:   Procedure Laterality Date    APPENDECTOMY      CARPAL TUNNEL RELEASE      CATARACT REMOVAL WITH IMPLANT Right 04/03/2019    INTRACAPSULAR CATARACT EXTRACTION Right 4/3/2019    PHACO EMULSIFICATION OF CATARACT WITH INTRAOCULAR LENS IMPLANT EYE performed by Kaitlin Garcia MD at [address] [facility] OTHER SURGICAL HISTORY  04/16/2018    right total knee replacement    [place] NJX DX/THER SBST INTRLMNR CRV/THRC W/IMG GDN Left 8/24/2018    LEFT LUMBAR FIVE SACRAL ONE EPIDURAL STEROID INJECTION SITE CONFIRMED BY FLUOROSCOPY performed by Julieth Elder MD at [address] History     Socioeconomic History    or sensory deficit. PHYSICAL EXAMINATION: Inspection of the left shoulder reveals warm, dry, intact skin. There is no adenopathy. The distal neurovascular exam is grossly intact. Range of motion is 45° of active forward flexion, passive forward flexion 160°, positive drop arm test, 3+ out of 5 strength with supraspinatus and infraspinatus testing. 5 out of 5 strength with subscapularis test, tenderness to palpation at the anterior acromion and greater tuberosity, no tenderness in the bicipital groove, weakness with speed's but no pain in the bicipital groove. Strength is graded 5/5 in all other muscle groups. Examination of the contralateral shoulder reveals no atrophy or deformity. The skin is warm and dry. Range of motion is within normal limits. There is no focal tenderness with palpation. Provocative SLAP, biceps tension, apprehension AC joint or rotator cuff tests are negative. Strength is graded 5/5 in all muscle groups. The distal neurovascular exam is grossly intact. Cervical spine: The skin is warm and dry. There is no swelling, warmth, or erythema. Range of motion is within normal limits. There is no paraspinal or spinous process tenderness. Spurling's sign is negative and did not produce shoulder pain. The distal neurovascular exam is grossly intact. Additional Comments: Sensation is intact to light touch throughout the median, ulnar and radial nerve distribution. Able to wiggle fingers, give thumbs up, A-OK and cross index and middle fingers. X-RAYS:  Four views of the left shoulder are obtained and reviewed. There is no periosteal reaction, medullary lesions, or osteopenia. Glenohumeral space is well maintained, the acromioclavicular joint space is decreased and inferior osteophytes are noted. The Acromiohumeral space is measure approximately 13mm. Type 2 acromion. The lung fields are clear.        Assessment:  Left shoulder pain and weakness concerning for rotator cuff tear    Impression:  Encounter Diagnoses   Name Primary?  Left shoulder pain, unspecified chronicity Yes    Tear of left rotator cuff, unspecified tear extent        Office Procedures:  Orders Placed This Encounter   Procedures    XR SHOULDER LEFT (MIN 2 VIEWS)     Standing Status:   Future     Number of Occurrences:   1     Standing Expiration Date:   4/17/2020    MRI Shoulder Left WO Contrast     Standing Status:   Future     Standing Expiration Date:   4/17/2020     Scheduling Instructions:      [facility]     Order Specific Question:   Reason for exam:     Answer:   left shoulder r/o rotator cuff tear     No orders of the defined types were placed in this encounter. Treatment Plan:  Based on patient's history and physical exam I'm concerned about  rotator cuff tear therefore I would like to obtain an MRI to further evaluate. Once the MRI is obtained the patient will follow up with me for further evaluation and discussion. Patient  agrees with this plan, all of their questions were answered best of our ability and to their satisfaction.         Steven Arzate

## 2019-04-19 ENCOUNTER — HOSPITAL ENCOUNTER (OUTPATIENT)
Dept: MRI IMAGING | Age: 64
Discharge: HOME | End: 2019-04-19
Payer: MEDICARE

## 2019-04-19 DIAGNOSIS — M75.102 TEAR OF LEFT ROTATOR CUFF, UNSPECIFIED TEAR EXTENT: ICD-10-CM

## 2019-04-19 DIAGNOSIS — M25.512 LEFT SHOULDER PAIN, UNSPECIFIED CHRONICITY: ICD-10-CM

## 2019-04-19 PROCEDURE — 73221 MRI JOINT UPR EXTREM W/O DYE: CPT

## 2019-04-21 NOTE — H&P
The H&P was reviewed, the patient was examined, and no change has occurred in the patient's condition since the H&P was completed.     Marcia Hernandez MD, PhD, FACS

## 2019-04-21 NOTE — OP NOTE
Catie Mireles    OPERATIVE NOTE    Preoperative Diagnosis: Cataract left eye    Postoperative Diagnosis: Cataract left eye     Procedure: Phacoemulsification with intraocular lens inplantation, left eye    Surgeon: Mindi Kemp M.D., Ph.D. Anesthesia: MAC with topical    Complications: none    Specimens: none    Indications for procedure: The patient is a 70y.o. year old with decreased vision, glare and halos around lights, and trouble with activities of daily living. Examination revealed a visually significant cataract in the left eye. Risks, benefits, and alternatives to surgery were discussed with the patient and the patient elected to proceed with phacoemulsification with lens implantation. Details of the procedure: Following informed consent, the patient was taken to the operating room and placed in the supine position. The eye was prepped and draped in the usual sterile fashion using aseptic technique for cataract surgery. Following topical tetracaine drops a side port incision was made in the inferotemporal cornea. The eye was filled with Trypan blue followed by balanced salt solution. The eye was filled with 1% non-preserved lidocaine. The eye was filled with viscoelastic and a 2.2 mm keratome blade was used to make a 3-plane clear corneal incision in the superotemporal cornea. The cystitome was used to make a tear in the anterior capsule and a Utrata forceps was used to make a complete curvilinear capsulorrhexis. The lens was hydrodissected and freely rotated. Phacoemulsification was performed. Irrigation/aspiration was used to remove all cortical material from the capsular bag. The eye was filled with viscoelastic and a foldable posterior chamber intraocular lens was injected into the capsular bag. Irrigation/aspiration was used to remove all excess viscoelastic. The eye was pressurized and the wounds were check for leaks and none were found.   The patient had Betadine and Alphagan solutions placed on the eye. The eye was covered with a metal shield. The patient went to the PACU in excellent condition, having tolerated the procedure extremely well, and will follow up with me tomorrow for postop day 1 care.     Marcia Lockett MD, PhD, FACS

## 2019-04-23 ENCOUNTER — ANESTHESIA EVENT (OUTPATIENT)
Dept: OPERATING ROOM | Age: 64
End: 2019-04-23
Payer: MEDICARE

## 2019-04-24 ENCOUNTER — ANESTHESIA (OUTPATIENT)
Dept: OPERATING ROOM | Age: 64
End: 2019-04-24
Payer: MEDICARE

## 2019-04-24 ENCOUNTER — HOSPITAL ENCOUNTER (OUTPATIENT)
Age: 64
Setting detail: OUTPATIENT SURGERY
Discharge: HOME | End: 2019-04-24
Attending: OPHTHALMOLOGY | Admitting: OPHTHALMOLOGY
Payer: MEDICARE

## 2019-04-24 VITALS
SYSTOLIC BLOOD PRESSURE: 119 MMHG | HEART RATE: 70 BPM | RESPIRATION RATE: 14 BRPM | BODY MASS INDEX: 40.12 KG/M2 | TEMPERATURE: 97.7 F | DIASTOLIC BLOOD PRESSURE: 52 MMHG | OXYGEN SATURATION: 94 % | HEIGHT: 64 IN | WEIGHT: 235 LBS

## 2019-04-24 VITALS — SYSTOLIC BLOOD PRESSURE: 143 MMHG | DIASTOLIC BLOOD PRESSURE: 71 MMHG | OXYGEN SATURATION: 100 %

## 2019-04-24 LAB
GLUCOSE BLD-MCNC: 79 MG/DL (ref 70–99)
GLUCOSE BLD-MCNC: 87 MG/DL (ref 70–99)
PERFORMED ON: NORMAL
PERFORMED ON: NORMAL

## 2019-04-24 PROCEDURE — 6370000000 HC RX 637 (ALT 250 FOR IP): Performed by: OPHTHALMOLOGY

## 2019-04-24 PROCEDURE — 2709999900 HC NON-CHARGEABLE SUPPLY: Performed by: OPHTHALMOLOGY

## 2019-04-24 PROCEDURE — 3600000013 HC SURGERY LEVEL 3 ADDTL 15MIN: Performed by: OPHTHALMOLOGY

## 2019-04-24 PROCEDURE — 6360000002 HC RX W HCPCS

## 2019-04-24 PROCEDURE — 6360000002 HC RX W HCPCS: Performed by: NURSE ANESTHETIST, CERTIFIED REGISTERED

## 2019-04-24 PROCEDURE — 2580000003 HC RX 258: Performed by: ANESTHESIOLOGY

## 2019-04-24 PROCEDURE — 7100000010 HC PHASE II RECOVERY - FIRST 15 MIN: Performed by: OPHTHALMOLOGY

## 2019-04-24 PROCEDURE — 3700000001 HC ADD 15 MINUTES (ANESTHESIA): Performed by: OPHTHALMOLOGY

## 2019-04-24 PROCEDURE — 3600000003 HC SURGERY LEVEL 3 BASE: Performed by: OPHTHALMOLOGY

## 2019-04-24 PROCEDURE — 2500000003 HC RX 250 WO HCPCS: Performed by: ANESTHESIOLOGY

## 2019-04-24 PROCEDURE — 7100000011 HC PHASE II RECOVERY - ADDTL 15 MIN: Performed by: OPHTHALMOLOGY

## 2019-04-24 PROCEDURE — 2500000003 HC RX 250 WO HCPCS: Performed by: OPHTHALMOLOGY

## 2019-04-24 PROCEDURE — 6360000002 HC RX W HCPCS: Performed by: OPHTHALMOLOGY

## 2019-04-24 PROCEDURE — V2632 POST CHMBR INTRAOCULAR LENS: HCPCS | Performed by: OPHTHALMOLOGY

## 2019-04-24 PROCEDURE — 3700000000 HC ANESTHESIA ATTENDED CARE: Performed by: OPHTHALMOLOGY

## 2019-04-24 DEVICE — ACRYSOF(R) IQ ASPHERIC IOL SP ACRYLIC FOLDABLELENS WULTRASERT(TM) DELIVERY SYSTEM UV WBLUE LIGHT FILTER. 13.0MM LENGTH 6.0MM ANTERIORASYMMETRIC BICONVEX OPTIC PLANAR HAPTICS.
Type: IMPLANTABLE DEVICE | Site: EYE | Status: FUNCTIONAL
Brand: ACRYSOF ULTRASERT

## 2019-04-24 RX ORDER — SODIUM CHLORIDE, SODIUM LACTATE, POTASSIUM CHLORIDE, CALCIUM CHLORIDE 600; 310; 30; 20 MG/100ML; MG/100ML; MG/100ML; MG/100ML
INJECTION, SOLUTION INTRAVENOUS CONTINUOUS
Status: DISCONTINUED | OUTPATIENT
Start: 2019-04-24 | End: 2019-04-24 | Stop reason: HOSPADM

## 2019-04-24 RX ORDER — ONDANSETRON 2 MG/ML
INJECTION INTRAMUSCULAR; INTRAVENOUS
Status: COMPLETED
Start: 2019-04-24 | End: 2019-04-24

## 2019-04-24 RX ORDER — ONDANSETRON 2 MG/ML
4 INJECTION INTRAMUSCULAR; INTRAVENOUS ONCE
Status: DISCONTINUED | OUTPATIENT
Start: 2019-04-24 | End: 2019-04-24 | Stop reason: HOSPADM

## 2019-04-24 RX ORDER — SODIUM CHLORIDE 0.9 % (FLUSH) 0.9 %
10 SYRINGE (ML) INJECTION EVERY 12 HOURS SCHEDULED
Status: DISCONTINUED | OUTPATIENT
Start: 2019-04-24 | End: 2019-04-24 | Stop reason: HOSPADM

## 2019-04-24 RX ORDER — TETRACAINE HYDROCHLORIDE 5 MG/ML
SOLUTION OPHTHALMIC PRN
Status: DISCONTINUED | OUTPATIENT
Start: 2019-04-24 | End: 2019-04-24 | Stop reason: ALTCHOICE

## 2019-04-24 RX ORDER — MIDAZOLAM HYDROCHLORIDE 1 MG/ML
INJECTION INTRAMUSCULAR; INTRAVENOUS PRN
Status: DISCONTINUED | OUTPATIENT
Start: 2019-04-24 | End: 2019-04-24 | Stop reason: SDUPTHER

## 2019-04-24 RX ORDER — PREDNISOLONE ACETATE 10 MG/ML
1 SUSPENSION/ DROPS OPHTHALMIC SEE ADMIN INSTRUCTIONS
Status: DISCONTINUED | OUTPATIENT
Start: 2019-04-24 | End: 2019-04-24 | Stop reason: HOSPADM

## 2019-04-24 RX ORDER — LIDOCAINE HYDROCHLORIDE 10 MG/ML
1 INJECTION, SOLUTION EPIDURAL; INFILTRATION; INTRACAUDAL; PERINEURAL
Status: COMPLETED | OUTPATIENT
Start: 2019-04-24 | End: 2019-04-24

## 2019-04-24 RX ORDER — SODIUM CHLORIDE 0.9 % (FLUSH) 0.9 %
10 SYRINGE (ML) INJECTION PRN
Status: DISCONTINUED | OUTPATIENT
Start: 2019-04-24 | End: 2019-04-24 | Stop reason: HOSPADM

## 2019-04-24 RX ORDER — SODIUM CHLORIDE, POTASSIUM CHLORIDE, CALCIUM CHLORIDE, MAGNESIUM CHLORIDE, SODIUM ACETATE, AND SODIUM CITRATE 6.4; .75; .48; .3; 3.9; 1.7 MG/ML; MG/ML; MG/ML; MG/ML; MG/ML; MG/ML
SOLUTION IRRIGATION PRN
Status: DISCONTINUED | OUTPATIENT
Start: 2019-04-24 | End: 2019-04-24 | Stop reason: ALTCHOICE

## 2019-04-24 RX ORDER — FENTANYL CITRATE 50 UG/ML
INJECTION, SOLUTION INTRAMUSCULAR; INTRAVENOUS PRN
Status: DISCONTINUED | OUTPATIENT
Start: 2019-04-24 | End: 2019-04-24 | Stop reason: SDUPTHER

## 2019-04-24 RX ORDER — ACETAMINOPHEN 650 MG
TABLET, EXTENDED RELEASE ORAL PRN
Status: DISCONTINUED | OUTPATIENT
Start: 2019-04-24 | End: 2019-04-24 | Stop reason: ALTCHOICE

## 2019-04-24 RX ORDER — MOXIFLOXACIN 5 MG/ML
1 SOLUTION/ DROPS OPHTHALMIC SEE ADMIN INSTRUCTIONS
Status: DISCONTINUED | OUTPATIENT
Start: 2019-04-24 | End: 2019-04-24 | Stop reason: HOSPADM

## 2019-04-24 RX ORDER — MOXIFLOXACIN 5 MG/ML
SOLUTION/ DROPS OPHTHALMIC PRN
Status: DISCONTINUED | OUTPATIENT
Start: 2019-04-24 | End: 2019-04-24 | Stop reason: ALTCHOICE

## 2019-04-24 RX ADMIN — MIDAZOLAM 1 MG: 1 INJECTION INTRAMUSCULAR; INTRAVENOUS at 08:16

## 2019-04-24 RX ADMIN — SODIUM CHLORIDE, POTASSIUM CHLORIDE, SODIUM LACTATE AND CALCIUM CHLORIDE: 600; 310; 30; 20 INJECTION, SOLUTION INTRAVENOUS at 06:57

## 2019-04-24 RX ADMIN — Medication 0.3 ML: at 07:04

## 2019-04-24 RX ADMIN — Medication 0.3 ML: at 06:57

## 2019-04-24 RX ADMIN — Medication 0.3 ML: at 07:12

## 2019-04-24 RX ADMIN — ONDANSETRON 4 MG: 2 INJECTION INTRAMUSCULAR; INTRAVENOUS at 08:50

## 2019-04-24 RX ADMIN — LIDOCAINE HYDROCHLORIDE 1 ML: 10 INJECTION, SOLUTION EPIDURAL; INFILTRATION; INTRACAUDAL; PERINEURAL at 06:57

## 2019-04-24 RX ADMIN — FENTANYL CITRATE 50 MCG: 50 INJECTION INTRAMUSCULAR; INTRAVENOUS at 08:13

## 2019-04-24 RX ADMIN — MIDAZOLAM 1 MG: 1 INJECTION INTRAMUSCULAR; INTRAVENOUS at 08:13

## 2019-04-24 ASSESSMENT — PULMONARY FUNCTION TESTS
PIF_VALUE: 0
PIF_VALUE: 1
PIF_VALUE: 0

## 2019-04-24 NOTE — ANESTHESIA POSTPROCEDURE EVALUATION
Department of Anesthesiology  Postprocedure Note    Patient: Simran Abdalla  MRN: [id number]  YOB: 1955  Date of evaluation: 4/24/2019  Time:  12:47 PM     Procedure Summary     Date:  04/24/19 Room / Location:  [place] [address] / [facility] OR    Anesthesia Start:  0812 Anesthesia Stop:  0831    Procedure:  PHACO EMULSIFICATION OF CATARACT WITH INTRAOCULAR LENS IMPLANT EYE (Left Eye) Diagnosis:  (CATARACT LEFT EYE)    Surgeon:  Arnie Brown MD Responsible Provider:  Alan Pop MD    Anesthesia Type:  MAC ASA Status:  3          Anesthesia Type: MAC    Salvador Phase I: Salvador Score: 10    Salvador Phase II: Salvador Score: 10    Last vitals: Reviewed and per EMR flowsheets.        Anesthesia Post Evaluation    Patient location during evaluation: PACU  Patient participation: complete - patient participated  Level of consciousness: awake and alert  Pain score: 0  Airway patency: patent  Nausea & Vomiting: no nausea and no vomiting  Complications: no  Cardiovascular status: blood pressure returned to baseline  Respiratory status: acceptable  Hydration status: stable

## 2019-04-24 NOTE — PROGRESS NOTES
Pt is being discharged to home. Discharge instructions and gone over and given to pt/daughter. Pt/daughter denies any questions or concerns at this time. IV removed. Pt is in stable condition. Belongings bagged up and sent with pt.

## 2019-04-24 NOTE — ANESTHESIA PRE PROCEDURE
phenylephrine 10%, tropicamide 1%, cyclopentolate 1%, moxifloxacin 0.5%, ketorolac 0.5% in lidocaine 2% jelly ophthalmic solution  0.3 mL Left Eye Q10 Min PRN Mini Dacosta MD   0.3 mL at 04/24/19 0704    moxifloxacin (VIGAMOX) 0.5 % ophthalmic solution 1 drop  1 drop Left Eye See Admin Instructions Mini Dacosta MD        prednisoLONE acetate (PRED FORTE) 1 % ophthalmic suspension 1 drop  1 drop Left Eye See Admin Instructions Mini Dacosta MD        epinephrine and lidocaine 2% PF in BSS injection (1 mL)   Intraocular Once Mini Dacosta MD        lactated ringers infusion   Intravenous Continuous Tequila Gallegos  mL/hr at 04/24/19 0657      sodium chloride flush 0.9 % injection 10 mL  10 mL Intravenous 2 times per day Tequila Gallegos MD        sodium chloride flush 0.9 % injection 10 mL  10 mL Intravenous PRN Tequila Gallegos MD           Allergies:     Allergies   Allergen Reactions    Plaquenil [Hydroxychloroquine Sulfate] Shortness Of Breath    Demerol Other (See Comments)     HALLUCINATE       Problem List:    Patient Active Problem List   Diagnosis Code    Pulmonary sarcoidosis ([address] D86.0    RA (rheumatoid arthritis) ([address] M06.9    Primary osteoarthritis of right knee M17.11    Status post total right knee replacement Z96.651    Lumbar radiculopathy M54.16       Past Medical History:        Diagnosis Date    Anemia     Arthritis     Bronchiectasis ([address]     Diabetes mellitus ([address]     Hypertension     Lung disease     Mediastinal lymphadenopathy     Sarcoidosis        Past Surgical History:        Procedure Laterality Date    APPENDECTOMY      CARPAL TUNNEL RELEASE      CATARACT REMOVAL WITH IMPLANT Right 04/03/2019    INTRACAPSULAR CATARACT EXTRACTION Right 4/3/2019    PHACO EMULSIFICATION OF CATARACT WITH INTRAOCULAR LENS IMPLANT EYE performed by Mini Dacosta MD at [address] [place] [address] OTHER SURGICAL HISTORY  04/16/2018    right total knee replacement    [place] NJX DX/THER SBST INTRLMNR CRV/THRC W/IMG GDN Left 8/24/2018    LEFT LUMBAR FIVE SACRAL ONE EPIDURAL STEROID INJECTION SITE CONFIRMED BY FLUOROSCOPY performed by Terry Cervantes MD at [facility]      RIGHT       Social History:    Social History     Tobacco Use    Smoking status: Never Smoker    Smokeless tobacco: Never Used   Substance Use Topics    Alcohol use: No     Alcohol/week: 0.0 oz                                Counseling given: Not Answered      Vital Signs (Current):   Vitals:    04/16/19 1444 04/24/19 0654   BP:  (!) 160/90   Pulse:  81   Resp:  16   Temp:  98.8 °F (37.1 °C)   TempSrc:  Temporal   SpO2:  96%   Weight: 235 lb (106.6 kg)    Height: 5' 3.5\" (1.613 m)                                               BP Readings from Last 3 Encounters:   04/24/19 (!) 160/90   04/03/19 (!) 147/70   04/03/19 131/73       NPO Status: Time of last liquid consumption: 0000                        Time of last solid consumption: 0000                        Date of last liquid consumption: 04/23/19                        Date of last solid food consumption: 04/23/19    BMI:   Wt Readings from Last 3 Encounters:   04/16/19 235 lb (106.6 kg)   04/18/19 235 lb 0.2 oz (106.6 kg)   04/01/19 235 lb (106.6 kg)     Body mass index is 40.98 kg/m².     CBC:   Lab Results   Component Value Date    WBC 8.9 04/19/2018    RBC 3.70 04/19/2018    HGB 11.1 04/19/2018    HCT 33.1 04/19/2018    MCV 89.5 04/19/2018    RDW 15.8 04/19/2018     04/19/2018       CMP:   Lab Results   Component Value Date     04/19/2018    K 3.3 04/19/2018     04/19/2018    CO2 30 04/19/2018    BUN 12 04/19/2018    CREATININE 0.7 04/19/2018    GFRAA >60 04/19/2018    GFRAA >60 08/20/2010    AGRATIO 1.6 12/17/2015    LABGLOM >60 04/19/2018    GLUCOSE 127 04/19/2018    PROT 6.6 12/17/2015    PROT 6.9 12/17/2015    PROT 6.5 05/10/2011    CALCIUM 8.3 04/19/2018    BILITOT 0.3 12/17/2015 BILITOT 0.3 12/17/2015    ALKPHOS 85 12/17/2015    ALKPHOS 84 12/17/2015    AST 15 12/17/2015    AST 16 12/17/2015    ALT 14 12/17/2015    ALT 14 12/17/2015       POC Tests: No results for input(s): POCGLU, POCNA, POCK, POCCL, POCBUN, POCHEMO, POCHCT in the last 72 hours. Coags:   Lab Results   Component Value Date    PROTIME 11.8 03/28/2018    INR 1.04 03/28/2018    APTT 27.4 03/28/2018       HCG (If Applicable): No results found for: PREGTESTUR, PREGSERUM, HCG, HCGQUANT     ABGs: No results found for: PHART, PO2ART, RRH2KRN, OCN0NAK, BEART, Y5NCSUEL     Type & Screen (If Applicable):  No results found for: LABABO, [address]    Anesthesia Evaluation  Patient summary reviewed and Nursing notes reviewed  Airway: Mallampati: III  TM distance: <3 FB   Neck ROM: full  Mouth opening: > = 3 FB Dental: normal exam         Pulmonary:normal exam  breath sounds clear to auscultation  (+) asthma:                            Cardiovascular:    (+) hypertension: mild, murmur,         Rhythm: regular  Rate: normal                    Neuro/Psych:   Negative Neuro/Psych ROS              GI/Hepatic/Renal:   (+) morbid obesity          Endo/Other:    (+) DiabetesType II DM, , .                 Abdominal:   (+) obese,         Vascular: negative vascular ROS. Anesthesia Plan      MAC     ASA 3       Induction: intravenous. Anesthetic plan and risks discussed with patient. Plan discussed with CRNA.                   Wilian Connors MD   4/24/2019

## 2019-04-25 ENCOUNTER — OFFICE VISIT (OUTPATIENT)
Dept: ORTHOPEDIC SURGERY | Age: 64
End: 2019-04-25
Payer: MEDICARE

## 2019-04-25 VITALS — BODY MASS INDEX: 40.12 KG/M2 | HEIGHT: 64 IN | WEIGHT: 235.01 LBS

## 2019-04-25 DIAGNOSIS — R20.2 NUMBNESS AND TINGLING IN LEFT HAND: ICD-10-CM

## 2019-04-25 DIAGNOSIS — M75.102 TEAR OF LEFT ROTATOR CUFF, UNSPECIFIED TEAR EXTENT: Primary | ICD-10-CM

## 2019-04-25 DIAGNOSIS — R20.0 NUMBNESS AND TINGLING IN LEFT HAND: ICD-10-CM

## 2019-04-25 DIAGNOSIS — G56.02 CARPAL TUNNEL SYNDROME OF LEFT WRIST: ICD-10-CM

## 2019-04-25 PROCEDURE — 4040F PNEUMOC VAC/ADMIN/RCVD: CPT | Performed by: ORTHOPAEDIC SURGERY

## 2019-04-25 PROCEDURE — 1123F ACP DISCUSS/DSCN MKR DOCD: CPT | Performed by: ORTHOPAEDIC SURGERY

## 2019-04-25 PROCEDURE — 3017F COLORECTAL CA SCREEN DOC REV: CPT | Performed by: ORTHOPAEDIC SURGERY

## 2019-04-25 PROCEDURE — 1090F PRES/ABSN URINE INCON ASSESS: CPT | Performed by: ORTHOPAEDIC SURGERY

## 2019-04-25 PROCEDURE — G8427 DOCREV CUR MEDS BY ELIG CLIN: HCPCS | Performed by: ORTHOPAEDIC SURGERY

## 2019-04-25 PROCEDURE — 99214 OFFICE O/P EST MOD 30 MIN: CPT | Performed by: ORTHOPAEDIC SURGERY

## 2019-04-25 PROCEDURE — 1036F TOBACCO NON-USER: CPT | Performed by: ORTHOPAEDIC SURGERY

## 2019-04-25 PROCEDURE — G8417 CALC BMI ABV UP PARAM F/U: HCPCS | Performed by: ORTHOPAEDIC SURGERY

## 2019-04-25 PROCEDURE — L3670 SO ACRO/CLAV CAN WEB PRE OTS: HCPCS | Performed by: ORTHOPAEDIC SURGERY

## 2019-04-25 PROCEDURE — G8400 PT W/DXA NO RESULTS DOC: HCPCS | Performed by: ORTHOPAEDIC SURGERY

## 2019-04-25 NOTE — PROGRESS NOTES
Chief Complaint:  Follow-up (TR MRI LEFT SHOULDER)      SUBJECTIVE:  Leona Reed is a 70 y.o. female who returns today to review MRI results of the left shoulder, continues to have 7 out of 10 shoulder pain and significant weakness, is unable to lift her arm overhead. She also complains of numbness and tingling down the left arm into the 1st 2nd and 3rd fingers. Had similar symptoms on the other side which is consistent with right carpal tunnel syndrome. She is here today with her .     Pain Assessment:  Pain Assessment  Location of Pain: Shoulder  Location Modifiers: Left  Severity of Pain: 7  Result of Injury: Yes  Work-Related Injury: No  Are there other pain locations you wish to document?: No      Medical History:  Past Medical History:   Diagnosis Date    Anemia     Arthritis     Bronchiectasis (HCC)     Diabetes mellitus ([address]     Hypertension     Lung disease     Mediastinal lymphadenopathy     Sarcoidosis      Past Surgical History:   Procedure Laterality Date    APPENDECTOMY      CARPAL TUNNEL RELEASE      CATARACT REMOVAL WITH IMPLANT Right 04/03/2019    EYE SURGERY Left 04/24/2019    [facility]ACO EMULSIFICATION OF CATARACT WITH INTRAOCULAR LENS IMPLANT EYE     INTRACAPSULAR CATARACT EXTRACTION Right 4/3/2019    PHACO EMULSIFICATION OF CATARACT WITH INTRAOCULAR LENS IMPLANT EYE performed by Lorraine Rodrigues MD at [address] Left 4/24/2019    PHACO EMULSIFICATION OF CATARACT WITH INTRAOCULAR LENS IMPLANT EYE performed by Lorraine Rodrigues MD at [facility] OTHER SURGICAL HISTORY  04/16/2018    right total knee replacement    [place] NJX DX/THER SBST INTRLMNR CRV/THRC W/IMG GDN Left 8/24/2018    LEFT LUMBAR FIVE SACRAL ONE EPIDURAL STEROID INJECTION SITE CONFIRMED BY FLUOROSCOPY performed by Mónica Burr MD at [facility]     Family History   Problem Relation Age of Onset    Cancer Mother         Lymphoma    Cancer Brother         Lymphoma    Asthma Neg Hx     Diabetes Neg Hx     Heart Failure Neg Hx     Emphysema Neg Hx     Hypertension Neg Hx      Social History     Socioeconomic History    Marital status:      Spouse name: None    Number of children: None    Years of education: None    Highest education level: None   Occupational History    None   Social Needs    Financial resource strain: None    Food insecurity:     Worry: None     Inability: None    Transportation needs:     Medical: None     Non-medical: None   Tobacco Use    Smoking status: Never Smoker    Smokeless tobacco: Never Used   Substance and Sexual Activity    Alcohol use: No     Alcohol/week: 0.0 oz    Drug use: No    Sexual activity: Yes     Partners: Male   Lifestyle    Physical activity:     Days per week: None     Minutes per session: None    Stress: None   Relationships    Social connections:     Talks on phone: None     Gets together: None     Attends Mandaeism service: None     Active member of club or organization: None     Attends meetings of clubs or organizations: None     Relationship status: None    Intimate partner violence:     Fear of current or ex partner: None     Emotionally abused: None     Physically abused: None     Forced sexual activity: None   Other Topics Concern    None   Social History Narrative    None     Current Outpatient Medications   Medication Sig Dispense Refill    metFORMIN (GLUCOPHAGE-XR) 500 MG extended release tablet Take 500 mg by mouth daily (with breakfast)      lisinopril (PRINIVIL;ZESTRIL) 10 MG tablet Take 10 mg by mouth daily      traMADol (ULTRAM) 50 MG tablet Take 100 mg by mouth daily.       meloxicam (MOBIC) 15 MG tablet I po qd PRN 30 tablet 1    aspirin 325 MG EC tablet Take 1 tablet by mouth 2 times daily (Patient taking differently: Take 81 mg by mouth daily ) 60 tablet 0    sertraline (ZOLOFT) 25 MG tablet Take 25 mg by mouth daily      gabapentin (NEURONTIN) 300 MG capsule Take 300 mg by mouth 3 times daily      hydrochlorothiazide (MICROZIDE) 12.5 MG capsule Take 12.5 mg by mouth daily.  predniSONE (DELTASONE) 10 MG tablet Take 5 mg by mouth daily       methotrexate 2.5 MG tablet Take 1 tablet by mouth once a week. Take 4 tablets by mouth ounce weekly. 48 tablet 3    folic acid (FOLVITE) 1 MG tablet Take 1 tablet by mouth daily. 90 tablet 3    omeprazole (PRILOSEC) 20 MG capsule Take 20 mg by mouth. Indications: pt states when needed       No current facility-administered medications for this visit. Allergies   Allergen Reactions    Plaquenil [Hydroxychloroquine Sulfate] Shortness Of Breath    Demerol Other (See Comments)     HALLUCINATE       Review of Systems:  Constitutional: negative  Respiratory: negative  Cardiovascular: negative  Musculoskeletal:negative except for Follow-up (TR MRI LEFT SHOULDER)    Relevant review of systems reviewed and available in the patient's chart in media tab    General Exam:   Constitutional: Patient is adequately groomed with no evidence of malnutrition  Mental Status: The patient is oriented to time, place and person. The patient's mood and affect are appropriate. Vascular: Examination reveals no swelling or calf tenderness. Peripheral pulses are palpable and 2+. OBJECTIVE:  Vital Signs:  Vitals:    04/25/19 1348   Weight: 235 lb 0.2 oz (106.6 kg)   Height: 5' 3.5\" (1.613 m)       Appearance: alert, well appearing, and in no distress, oriented to person, place, and time and overweight. Physical exam:   Physical examination unchanged, limited forward flexion secondary to pain and weakness, positive drop arm test.  Distal neurovascular exam is intact. Patient also has positive Tinel's and Phalen's at the left wrist.  Decreased sensation median nerve distribution. X-RAYS:  Four views of the left shoulder are reviewed.  There is no periosteal reaction, medullary lesions, or osteopenia. Glenohumeral space is well maintained, the acromioclavicular joint space is decreased and inferior osteophytes are noted. The Acromiohumeral space is measure approximately 13mm. Type 2 acromion. The lung fields are clear. MRI images of the left shoulder were reviewed and show: The long head biceps tendon is intact and appropriately positioned within the bicipital groove. Small volume of fluid in the biceps tendon sheath. Full-thickness full width supraspinatus tendon tear with medial retraction to the superior aspect of the humeral head on a background of moderate tendinosis. Mild fatty atrophy of the muscle belly. Retracted 2.2 cm  Large high-grade partial-thickness articular surface versus full-thickness tear of the infraspinatus tendon footprint on a background of moderate tendinosis. No significant retraction. Mild fatty atrophy of the muscle belly. Low-grade partial-thickness articular surface tear of the subscapularis tendon footprint. Moderate acromioclavicular degenerative changes. Mild glenohumeral degenerative changes with a moderate effusion. Assessment :  Left shoulder rotator cuff tear; carpal tunnel syndrome left side    Impression:  Encounter Diagnoses   Name Primary?  Tear of left rotator cuff, unspecified tear extent Yes    Numbness and tingling in left hand     Carpal tunnel syndrome of left wrist        Office Procedures:  Orders Placed This Encounter   Procedures    EMG     Standing Status:   Future     Standing Expiration Date:   4/25/2020     Scheduling Instructions:      PATIENT CAN GO TO [facility] WITH NO APPOINTMENT NEEDED. Order Specific Question:   Which body part? Answer:   L UPPER EXT    Sling Shot II Breg Brace     Patient was prescribed a Breg Sling Shot II Shoulder Brace. The left shoulder will require stabilization / immobilization from this orthosis.   The orthosis will assist in protecting the affected area, provide Left carpal tunnel release  Risks and benefits of each option were discussed in great detail with the patient, at this time the patient would like to pursue surgical intervention. I spent 25+ minutes face to face with the patient including 13+ minutes discussing and answering questions regarding the risks, benefits, and complications of left shoulder video arthroscopy rotator cuff repair, subacromial decompression and biceps work as needed. And left carpal tunnel release in detail. We talked about the risks of surgery which include but are not limited to infection, bleeding, nerve injury resulting in motor or sensory loss, DVT, RSD, persistent pain, loss of motion, functional instability, and need for further surgery. At no time were any guarantees implied or stated. The patient acknowledged these risks and electively reviewed and signed the consent form. Surgery will be scheduled for a mutually convenient time. Patient will obtain medical clearance from their PCP prior to surgery. Sling was provided today to wear his postoperative. We will obtain an EMG to confirm carpal tunnel prior to surgery.     Jhonny Tejeda

## 2019-05-06 ENCOUNTER — HOSPITAL ENCOUNTER (OUTPATIENT)
Dept: NEUROLOGY | Age: 64
Discharge: HOME | End: 2019-05-06
Payer: MEDICARE

## 2019-05-06 DIAGNOSIS — R20.0 NUMBNESS AND TINGLING IN LEFT HAND: ICD-10-CM

## 2019-05-06 DIAGNOSIS — R20.2 NUMBNESS AND TINGLING IN LEFT HAND: ICD-10-CM

## 2019-05-06 DIAGNOSIS — M75.102 TEAR OF LEFT ROTATOR CUFF, UNSPECIFIED TEAR EXTENT: ICD-10-CM

## 2019-05-06 PROCEDURE — 95886 MUSC TEST DONE W/N TEST COMP: CPT

## 2019-05-06 PROCEDURE — 95908 NRV CNDJ TST 3-4 STUDIES: CPT

## 2019-05-06 NOTE — PROCEDURES
Electrodiagnostic Report  [facility]  Physical Medicine & Rehabilitation    05/06/19    Angelina Wade  70 y.o.  1955  [id number]  Referring Provider: Shanika Perez DO    Clinical Problem:  [age] y/o with history of left hand numbness. Onset prior to rotator cuff tear, one month ago. PMH: recent diagnosis of diabetes, + right carpal tunnel surgery, and rotator cuff repair 10 yrs ago.  PE: reflexes trace, + thumb opposition weakness      EMG SUMMARY TABLE LEFT UPPER     Spontaneous     MUAP   Recruitment    Insertional Activity Fibrillation Potential Positive Sharp Waves   Fasiculation High Frequency Potentials   Amp Duration PPP Pattern   Deltoid C5.6 Ax normal none none none none normal normal normal normal   Biceps C5,6 musc cut normal none none none none normal normal normal normal   Triceps C6,7,8 Radial normal none none none none normal normal normal normal   Pronator Teres C6,7 Median normal none none none none normal normal normal normal   Brachio Rad C5,6 Radial normal none none none none normal normal normal normal   Ext Ind Prop C7,8 Radial normal none none none none normal normal normal normal   Oppones Poll C8-T1 Median normal none none none none normal normal normal normal   1st Dorsal Int C8-T1 Ulnar normal none none none none normal normal normal normal   Cerv Paraspinals C2-T1 PPR       normal none none none none normal normal normal normal     Nerve Conduction Studies     Nerve Sensory Distal Latency (msec) Sensory Distal Latency (msec) Amp uv Amp uv Motor Distal Latency (msec) Motor Distal Latency (msec) Amp kv Amp kv Motor NCV (m/sec) Motor NCV (m/sec)    Right Left Right Left Right Left Right Left Right Left   Median (n<3.6) abs(n<3.6)   (n<4.3) 6.3 (n<4.3)  2.0  (n>50) 50 (n>50)   Ulnar (n<3.7) 3.7 (n<3.7)  18 (n<4.2) 4.2 (n<4.2)  9.4   8.2 b.e(n>50)   a.e(>45) 58 b.e(n>50)  55 a.e(>45)   Radial (n<3.5) (n<3.5)             Summary of EMG and Nerve Conduction Findings: The above EMG needle exam was within normal limits. Nerve conduction studies demonstrate absent left median sensory distal latency and prolongation of left median motor distal latency, with small amplitude response of left median motor evoked response. Ulnar study within normal limits. Overall Impression: Left carpal tunnel syndrome, moderate severity. No evidence of an acute radiculopathy or other entrapment neuropathy. Thank you. Electronically signed by:  Kamala Mooney DO,5/6/2019,10:14 AM

## 2019-05-07 ENCOUNTER — ANESTHESIA EVENT (OUTPATIENT)
Dept: OPERATING ROOM | Age: 64
End: 2019-05-07
Payer: MEDICARE

## 2019-05-08 ENCOUNTER — ANESTHESIA (OUTPATIENT)
Dept: OPERATING ROOM | Age: 64
End: 2019-05-08
Payer: MEDICARE

## 2019-05-08 ENCOUNTER — HOSPITAL ENCOUNTER (OUTPATIENT)
Age: 64
Setting detail: OUTPATIENT SURGERY
Discharge: HOME | End: 2019-05-08
Attending: ORTHOPAEDIC SURGERY | Admitting: ORTHOPAEDIC SURGERY
Payer: MEDICARE

## 2019-05-08 ENCOUNTER — TELEPHONE (OUTPATIENT)
Dept: ORTHOPEDIC SURGERY | Age: 64
End: 2019-05-08

## 2019-05-08 VITALS
RESPIRATION RATE: 18 BRPM | WEIGHT: 235 LBS | DIASTOLIC BLOOD PRESSURE: 76 MMHG | HEART RATE: 86 BPM | HEIGHT: 63 IN | SYSTOLIC BLOOD PRESSURE: 136 MMHG | OXYGEN SATURATION: 95 % | BODY MASS INDEX: 41.64 KG/M2 | TEMPERATURE: 97 F

## 2019-05-08 VITALS
RESPIRATION RATE: 1 BRPM | TEMPERATURE: 97.2 F | SYSTOLIC BLOOD PRESSURE: 143 MMHG | DIASTOLIC BLOOD PRESSURE: 70 MMHG | OXYGEN SATURATION: 97 %

## 2019-05-08 DIAGNOSIS — M75.102 TEAR OF LEFT ROTATOR CUFF, UNSPECIFIED TEAR EXTENT: Primary | ICD-10-CM

## 2019-05-08 DIAGNOSIS — G56.02 CARPAL TUNNEL SYNDROME OF LEFT WRIST: ICD-10-CM

## 2019-05-08 LAB
GLUCOSE BLD-MCNC: 170 MG/DL (ref 70–99)
GLUCOSE BLD-MCNC: 89 MG/DL (ref 70–99)
PERFORMED ON: ABNORMAL
PERFORMED ON: NORMAL

## 2019-05-08 PROCEDURE — 7100000010 HC PHASE II RECOVERY - FIRST 15 MIN: Performed by: ORTHOPAEDIC SURGERY

## 2019-05-08 PROCEDURE — 3700000000 HC ANESTHESIA ATTENDED CARE: Performed by: ORTHOPAEDIC SURGERY

## 2019-05-08 PROCEDURE — 2500000003 HC RX 250 WO HCPCS: Performed by: ANESTHESIOLOGY

## 2019-05-08 PROCEDURE — 2500000003 HC RX 250 WO HCPCS: Performed by: ORTHOPAEDIC SURGERY

## 2019-05-08 PROCEDURE — 7100000011 HC PHASE II RECOVERY - ADDTL 15 MIN: Performed by: ORTHOPAEDIC SURGERY

## 2019-05-08 PROCEDURE — 2500000003 HC RX 250 WO HCPCS: Performed by: NURSE ANESTHETIST, CERTIFIED REGISTERED

## 2019-05-08 PROCEDURE — 3600000014 HC SURGERY LEVEL 4 ADDTL 15MIN: Performed by: ORTHOPAEDIC SURGERY

## 2019-05-08 PROCEDURE — 3600000004 HC SURGERY LEVEL 4 BASE: Performed by: ORTHOPAEDIC SURGERY

## 2019-05-08 PROCEDURE — 6360000002 HC RX W HCPCS: Performed by: ORTHOPAEDIC SURGERY

## 2019-05-08 PROCEDURE — 2709999900 HC NON-CHARGEABLE SUPPLY: Performed by: ORTHOPAEDIC SURGERY

## 2019-05-08 PROCEDURE — 64415 NJX AA&/STRD BRCH PLXS IMG: CPT | Performed by: ANESTHESIOLOGY

## 2019-05-08 PROCEDURE — 3700000001 HC ADD 15 MINUTES (ANESTHESIA): Performed by: ORTHOPAEDIC SURGERY

## 2019-05-08 PROCEDURE — 2580000003 HC RX 258: Performed by: ORTHOPAEDIC SURGERY

## 2019-05-08 PROCEDURE — 2580000003 HC RX 258: Performed by: ANESTHESIOLOGY

## 2019-05-08 PROCEDURE — 7100000000 HC PACU RECOVERY - FIRST 15 MIN: Performed by: ORTHOPAEDIC SURGERY

## 2019-05-08 PROCEDURE — 6370000000 HC RX 637 (ALT 250 FOR IP): Performed by: ANESTHESIOLOGY

## 2019-05-08 PROCEDURE — 6360000002 HC RX W HCPCS: Performed by: ANESTHESIOLOGY

## 2019-05-08 PROCEDURE — 6360000002 HC RX W HCPCS: Performed by: NURSE ANESTHETIST, CERTIFIED REGISTERED

## 2019-05-08 PROCEDURE — C1713 ANCHOR/SCREW BN/BN,TIS/BN: HCPCS | Performed by: ORTHOPAEDIC SURGERY

## 2019-05-08 PROCEDURE — 2720000010 HC SURG SUPPLY STERILE: Performed by: ORTHOPAEDIC SURGERY

## 2019-05-08 PROCEDURE — 7100000001 HC PACU RECOVERY - ADDTL 15 MIN: Performed by: ORTHOPAEDIC SURGERY

## 2019-05-08 DEVICE — ANCHOR SUT DIA45MM BIOCRYL RAPIDE ABSRB 3 DYNACORD SZ 2: Type: IMPLANTABLE DEVICE | Status: FUNCTIONAL

## 2019-05-08 DEVICE — SUTURE ORTHOCORD SZ 2-0 VLT BLU MO-7 NDL MULTIPAK 223114: Type: IMPLANTABLE DEVICE | Status: FUNCTIONAL

## 2019-05-08 DEVICE — ANCHOR SUT DIA4.5MM BIOCRYL RAPIDE ABSRB 2 DYNACORD SZ 2: Type: IMPLANTABLE DEVICE | Status: FUNCTIONAL

## 2019-05-08 DEVICE — ANCHOR 4.75MM HEALIX ADVANCE KNOTLESS BR: Type: IMPLANTABLE DEVICE | Status: FUNCTIONAL

## 2019-05-08 RX ORDER — OXYCODONE HYDROCHLORIDE AND ACETAMINOPHEN 5; 325 MG/1; MG/1
2 TABLET ORAL PRN
Status: DISCONTINUED | OUTPATIENT
Start: 2019-05-08 | End: 2019-05-08 | Stop reason: HOSPADM

## 2019-05-08 RX ORDER — OXYCODONE HYDROCHLORIDE AND ACETAMINOPHEN 5; 325 MG/1; MG/1
1 TABLET ORAL EVERY 6 HOURS PRN
Qty: 28 TABLET | Refills: 0 | Status: SHIPPED | OUTPATIENT
Start: 2019-05-08 | End: 2019-05-15

## 2019-05-08 RX ORDER — BUPIVACAINE HYDROCHLORIDE 2.5 MG/ML
INJECTION, SOLUTION EPIDURAL; INFILTRATION; INTRACAUDAL PRN
Status: DISCONTINUED | OUTPATIENT
Start: 2019-05-08 | End: 2019-05-08 | Stop reason: ALTCHOICE

## 2019-05-08 RX ORDER — DEXAMETHASONE SODIUM PHOSPHATE 4 MG/ML
INJECTION, SOLUTION INTRA-ARTICULAR; INTRALESIONAL; INTRAMUSCULAR; INTRAVENOUS; SOFT TISSUE PRN
Status: DISCONTINUED | OUTPATIENT
Start: 2019-05-08 | End: 2019-05-08 | Stop reason: SDUPTHER

## 2019-05-08 RX ORDER — LIDOCAINE HYDROCHLORIDE 20 MG/ML
INJECTION, SOLUTION INFILTRATION; PERINEURAL PRN
Status: DISCONTINUED | OUTPATIENT
Start: 2019-05-08 | End: 2019-05-08 | Stop reason: SDUPTHER

## 2019-05-08 RX ORDER — SCOLOPAMINE TRANSDERMAL SYSTEM 1 MG/1
1 PATCH, EXTENDED RELEASE TRANSDERMAL ONCE
Status: DISCONTINUED | OUTPATIENT
Start: 2019-05-08 | End: 2019-05-08 | Stop reason: HOSPADM

## 2019-05-08 RX ORDER — ACETAMINOPHEN 10 MG/ML
1000 INJECTION, SOLUTION INTRAVENOUS ONCE
Status: COMPLETED | OUTPATIENT
Start: 2019-05-08 | End: 2019-05-08

## 2019-05-08 RX ORDER — SODIUM CHLORIDE, SODIUM LACTATE, POTASSIUM CHLORIDE, CALCIUM CHLORIDE 600; 310; 30; 20 MG/100ML; MG/100ML; MG/100ML; MG/100ML
INJECTION, SOLUTION INTRAVENOUS CONTINUOUS
Status: DISCONTINUED | OUTPATIENT
Start: 2019-05-08 | End: 2019-05-08 | Stop reason: HOSPADM

## 2019-05-08 RX ORDER — BUPIVACAINE HYDROCHLORIDE 5 MG/ML
INJECTION, SOLUTION EPIDURAL; INTRACAUDAL
Status: DISCONTINUED
Start: 2019-05-08 | End: 2019-05-08 | Stop reason: HOSPADM

## 2019-05-08 RX ORDER — DEXAMETHASONE SODIUM PHOSPHATE 10 MG/ML
INJECTION, SOLUTION INTRAMUSCULAR; INTRAVENOUS
Status: DISCONTINUED
Start: 2019-05-08 | End: 2019-05-08 | Stop reason: HOSPADM

## 2019-05-08 RX ORDER — ONDANSETRON 2 MG/ML
INJECTION INTRAMUSCULAR; INTRAVENOUS PRN
Status: DISCONTINUED | OUTPATIENT
Start: 2019-05-08 | End: 2019-05-08 | Stop reason: SDUPTHER

## 2019-05-08 RX ORDER — CEFAZOLIN SODIUM 2 G/50ML
2 SOLUTION INTRAVENOUS ONCE
Status: COMPLETED | OUTPATIENT
Start: 2019-05-08 | End: 2019-05-08

## 2019-05-08 RX ORDER — PROMETHAZINE HYDROCHLORIDE 25 MG/ML
6.25 INJECTION, SOLUTION INTRAMUSCULAR; INTRAVENOUS
Status: COMPLETED | OUTPATIENT
Start: 2019-05-08 | End: 2019-05-08

## 2019-05-08 RX ORDER — PROMETHAZINE HYDROCHLORIDE 25 MG/1
25 TABLET ORAL EVERY 6 HOURS PRN
Qty: 20 TABLET | Refills: 0 | Status: SHIPPED | OUTPATIENT
Start: 2019-05-08 | End: 2019-05-15

## 2019-05-08 RX ORDER — LABETALOL HYDROCHLORIDE 5 MG/ML
5 INJECTION, SOLUTION INTRAVENOUS EVERY 10 MIN PRN
Status: DISCONTINUED | OUTPATIENT
Start: 2019-05-08 | End: 2019-05-08 | Stop reason: HOSPADM

## 2019-05-08 RX ORDER — MORPHINE SULFATE 10 MG/ML
1 INJECTION, SOLUTION INTRAMUSCULAR; INTRAVENOUS EVERY 5 MIN PRN
Status: DISCONTINUED | OUTPATIENT
Start: 2019-05-08 | End: 2019-05-08 | Stop reason: HOSPADM

## 2019-05-08 RX ORDER — SODIUM CHLORIDE 0.9 % (FLUSH) 0.9 %
10 SYRINGE (ML) INJECTION EVERY 12 HOURS SCHEDULED
Status: DISCONTINUED | OUTPATIENT
Start: 2019-05-08 | End: 2019-05-08 | Stop reason: HOSPADM

## 2019-05-08 RX ORDER — ROCURONIUM BROMIDE 10 MG/ML
INJECTION, SOLUTION INTRAVENOUS PRN
Status: DISCONTINUED | OUTPATIENT
Start: 2019-05-08 | End: 2019-05-08 | Stop reason: SDUPTHER

## 2019-05-08 RX ORDER — MORPHINE SULFATE 10 MG/ML
2 INJECTION, SOLUTION INTRAMUSCULAR; INTRAVENOUS EVERY 5 MIN PRN
Status: DISCONTINUED | OUTPATIENT
Start: 2019-05-08 | End: 2019-05-08 | Stop reason: HOSPADM

## 2019-05-08 RX ORDER — DIPHENHYDRAMINE HYDROCHLORIDE 50 MG/ML
12.5 INJECTION INTRAMUSCULAR; INTRAVENOUS
Status: DISCONTINUED | OUTPATIENT
Start: 2019-05-08 | End: 2019-05-08 | Stop reason: HOSPADM

## 2019-05-08 RX ORDER — PROMETHAZINE HYDROCHLORIDE 25 MG/ML
6.25 INJECTION, SOLUTION INTRAMUSCULAR; INTRAVENOUS EVERY 6 HOURS PRN
Status: DISCONTINUED | OUTPATIENT
Start: 2019-05-08 | End: 2019-05-08 | Stop reason: HOSPADM

## 2019-05-08 RX ORDER — HYDRALAZINE HYDROCHLORIDE 20 MG/ML
5 INJECTION INTRAMUSCULAR; INTRAVENOUS EVERY 10 MIN PRN
Status: DISCONTINUED | OUTPATIENT
Start: 2019-05-08 | End: 2019-05-08 | Stop reason: HOSPADM

## 2019-05-08 RX ORDER — FENTANYL CITRATE 50 UG/ML
INJECTION, SOLUTION INTRAMUSCULAR; INTRAVENOUS PRN
Status: DISCONTINUED | OUTPATIENT
Start: 2019-05-08 | End: 2019-05-08 | Stop reason: SDUPTHER

## 2019-05-08 RX ORDER — SODIUM CHLORIDE 0.9 % (FLUSH) 0.9 %
10 SYRINGE (ML) INJECTION PRN
Status: DISCONTINUED | OUTPATIENT
Start: 2019-05-08 | End: 2019-05-08 | Stop reason: HOSPADM

## 2019-05-08 RX ORDER — MIDAZOLAM HYDROCHLORIDE 1 MG/ML
INJECTION INTRAMUSCULAR; INTRAVENOUS
Status: DISCONTINUED
Start: 2019-05-08 | End: 2019-05-08 | Stop reason: HOSPADM

## 2019-05-08 RX ORDER — ONDANSETRON 2 MG/ML
4 INJECTION INTRAMUSCULAR; INTRAVENOUS PRN
Status: DISCONTINUED | OUTPATIENT
Start: 2019-05-08 | End: 2019-05-08 | Stop reason: HOSPADM

## 2019-05-08 RX ORDER — OXYCODONE HYDROCHLORIDE AND ACETAMINOPHEN 5; 325 MG/1; MG/1
1 TABLET ORAL PRN
Status: DISCONTINUED | OUTPATIENT
Start: 2019-05-08 | End: 2019-05-08 | Stop reason: HOSPADM

## 2019-05-08 RX ORDER — PROPOFOL 10 MG/ML
INJECTION, EMULSION INTRAVENOUS PRN
Status: DISCONTINUED | OUTPATIENT
Start: 2019-05-08 | End: 2019-05-08 | Stop reason: SDUPTHER

## 2019-05-08 RX ORDER — LIDOCAINE HYDROCHLORIDE 10 MG/ML
1 INJECTION, SOLUTION EPIDURAL; INFILTRATION; INTRACAUDAL; PERINEURAL
Status: COMPLETED | OUTPATIENT
Start: 2019-05-08 | End: 2019-05-08

## 2019-05-08 RX ADMIN — ROCURONIUM BROMIDE 50 MG: 10 INJECTION, SOLUTION INTRAVENOUS at 08:28

## 2019-05-08 RX ADMIN — PROPOFOL 200 MG: 10 INJECTION, EMULSION INTRAVENOUS at 08:28

## 2019-05-08 RX ADMIN — ONDANSETRON 4 MG: 2 INJECTION, SOLUTION INTRAMUSCULAR; INTRAVENOUS at 08:28

## 2019-05-08 RX ADMIN — CEFAZOLIN SODIUM 2 G: 2 SOLUTION INTRAVENOUS at 08:19

## 2019-05-08 RX ADMIN — PHENYLEPHRINE HYDROCHLORIDE 40 MCG: 10 INJECTION INTRAVENOUS at 10:35

## 2019-05-08 RX ADMIN — FENTANYL CITRATE 25 MCG: 50 INJECTION INTRAMUSCULAR; INTRAVENOUS at 08:56

## 2019-05-08 RX ADMIN — LIDOCAINE HYDROCHLORIDE 40 MG: 20 INJECTION, SOLUTION INFILTRATION; PERINEURAL at 08:27

## 2019-05-08 RX ADMIN — PROMETHAZINE HYDROCHLORIDE 6.25 MG: 25 INJECTION INTRAMUSCULAR; INTRAVENOUS at 11:57

## 2019-05-08 RX ADMIN — FENTANYL CITRATE 25 MCG: 50 INJECTION INTRAMUSCULAR; INTRAVENOUS at 09:49

## 2019-05-08 RX ADMIN — SODIUM CHLORIDE, POTASSIUM CHLORIDE, SODIUM LACTATE AND CALCIUM CHLORIDE: 600; 310; 30; 20 INJECTION, SOLUTION INTRAVENOUS at 08:05

## 2019-05-08 RX ADMIN — PHENYLEPHRINE HYDROCHLORIDE 40 MCG: 10 INJECTION INTRAVENOUS at 10:26

## 2019-05-08 RX ADMIN — LIDOCAINE HYDROCHLORIDE 0.1 ML: 10 INJECTION, SOLUTION EPIDURAL; INFILTRATION; INTRACAUDAL; PERINEURAL at 08:06

## 2019-05-08 RX ADMIN — ROCURONIUM BROMIDE 10 MG: 10 INJECTION, SOLUTION INTRAVENOUS at 09:57

## 2019-05-08 RX ADMIN — SUGAMMADEX 200 MG: 100 INJECTION, SOLUTION INTRAVENOUS at 11:04

## 2019-05-08 RX ADMIN — PHENYLEPHRINE HYDROCHLORIDE 40 MCG: 10 INJECTION INTRAVENOUS at 10:05

## 2019-05-08 RX ADMIN — ONDANSETRON 4 MG: 2 INJECTION INTRAMUSCULAR; INTRAVENOUS at 11:36

## 2019-05-08 RX ADMIN — DEXAMETHASONE SODIUM PHOSPHATE 4 MG: 4 INJECTION, SOLUTION INTRAMUSCULAR; INTRAVENOUS at 08:28

## 2019-05-08 RX ADMIN — ACETAMINOPHEN 1000 MG: 10 INJECTION, SOLUTION INTRAVENOUS at 08:05

## 2019-05-08 RX ADMIN — SODIUM CHLORIDE, POTASSIUM CHLORIDE, SODIUM LACTATE AND CALCIUM CHLORIDE: 600; 310; 30; 20 INJECTION, SOLUTION INTRAVENOUS at 08:21

## 2019-05-08 RX ADMIN — PROMETHAZINE HYDROCHLORIDE 6.25 MG: 25 INJECTION INTRAMUSCULAR; INTRAVENOUS at 12:40

## 2019-05-08 ASSESSMENT — PULMONARY FUNCTION TESTS
PIF_VALUE: 16
PIF_VALUE: 20
PIF_VALUE: 20
PIF_VALUE: 17
PIF_VALUE: 23
PIF_VALUE: 23
PIF_VALUE: 22
PIF_VALUE: 18
PIF_VALUE: 18
PIF_VALUE: 22
PIF_VALUE: 17
PIF_VALUE: 19
PIF_VALUE: 22
PIF_VALUE: 12
PIF_VALUE: 20
PIF_VALUE: 20
PIF_VALUE: 19
PIF_VALUE: 20
PIF_VALUE: 17
PIF_VALUE: 0
PIF_VALUE: 21
PIF_VALUE: 18
PIF_VALUE: 22
PIF_VALUE: 18
PIF_VALUE: 22
PIF_VALUE: 0
PIF_VALUE: 19
PIF_VALUE: 18
PIF_VALUE: 21
PIF_VALUE: 25
PIF_VALUE: 21
PIF_VALUE: 19
PIF_VALUE: 23
PIF_VALUE: 19
PIF_VALUE: 23
PIF_VALUE: 2
PIF_VALUE: 1
PIF_VALUE: 20
PIF_VALUE: 22
PIF_VALUE: 2
PIF_VALUE: 20
PIF_VALUE: 22
PIF_VALUE: 23
PIF_VALUE: 23
PIF_VALUE: 14
PIF_VALUE: 13
PIF_VALUE: 23
PIF_VALUE: 19
PIF_VALUE: 22
PIF_VALUE: 20
PIF_VALUE: 22
PIF_VALUE: 22
PIF_VALUE: 19
PIF_VALUE: 24
PIF_VALUE: 22
PIF_VALUE: 20
PIF_VALUE: 22
PIF_VALUE: 17
PIF_VALUE: 20
PIF_VALUE: 19
PIF_VALUE: 20
PIF_VALUE: 18
PIF_VALUE: 20
PIF_VALUE: 22
PIF_VALUE: 21
PIF_VALUE: 1
PIF_VALUE: 5
PIF_VALUE: 22
PIF_VALUE: 20
PIF_VALUE: 24
PIF_VALUE: 1
PIF_VALUE: 20
PIF_VALUE: 23
PIF_VALUE: 19
PIF_VALUE: 23
PIF_VALUE: 22
PIF_VALUE: 18
PIF_VALUE: 23
PIF_VALUE: 2
PIF_VALUE: 16
PIF_VALUE: 23
PIF_VALUE: 22
PIF_VALUE: 22
PIF_VALUE: 19
PIF_VALUE: 10
PIF_VALUE: 14
PIF_VALUE: 20
PIF_VALUE: 21
PIF_VALUE: 19
PIF_VALUE: 0
PIF_VALUE: 20
PIF_VALUE: 23
PIF_VALUE: 24
PIF_VALUE: 19
PIF_VALUE: 19
PIF_VALUE: 22
PIF_VALUE: 1
PIF_VALUE: 2
PIF_VALUE: 20
PIF_VALUE: 21
PIF_VALUE: 2
PIF_VALUE: 2
PIF_VALUE: 23
PIF_VALUE: 17
PIF_VALUE: 23
PIF_VALUE: 2
PIF_VALUE: 16
PIF_VALUE: 19
PIF_VALUE: 5
PIF_VALUE: 20
PIF_VALUE: 22
PIF_VALUE: 23
PIF_VALUE: 19
PIF_VALUE: 22
PIF_VALUE: 23
PIF_VALUE: 23
PIF_VALUE: 17
PIF_VALUE: 22
PIF_VALUE: 16
PIF_VALUE: 22
PIF_VALUE: 3
PIF_VALUE: 19
PIF_VALUE: 0
PIF_VALUE: 2
PIF_VALUE: 21
PIF_VALUE: 2
PIF_VALUE: 0
PIF_VALUE: 3
PIF_VALUE: 21
PIF_VALUE: 20
PIF_VALUE: 2
PIF_VALUE: 19
PIF_VALUE: 18
PIF_VALUE: 23
PIF_VALUE: 15
PIF_VALUE: 12
PIF_VALUE: 17
PIF_VALUE: 22
PIF_VALUE: 23
PIF_VALUE: 20
PIF_VALUE: 23
PIF_VALUE: 1
PIF_VALUE: 20
PIF_VALUE: 23
PIF_VALUE: 23
PIF_VALUE: 19
PIF_VALUE: 23
PIF_VALUE: 22
PIF_VALUE: 23
PIF_VALUE: 22
PIF_VALUE: 22
PIF_VALUE: 3
PIF_VALUE: 22
PIF_VALUE: 18
PIF_VALUE: 20
PIF_VALUE: 22
PIF_VALUE: 1
PIF_VALUE: 20
PIF_VALUE: 20
PIF_VALUE: 7
PIF_VALUE: 23
PIF_VALUE: 23
PIF_VALUE: 6
PIF_VALUE: 1
PIF_VALUE: 22
PIF_VALUE: 18
PIF_VALUE: 0
PIF_VALUE: 2
PIF_VALUE: 22

## 2019-05-08 ASSESSMENT — PAIN SCALES - GENERAL
PAINLEVEL_OUTOF10: 0

## 2019-05-08 ASSESSMENT — PAIN - FUNCTIONAL ASSESSMENT
PAIN_FUNCTIONAL_ASSESSMENT: PREVENTS OR INTERFERES SOME ACTIVE ACTIVITIES AND ADLS
PAIN_FUNCTIONAL_ASSESSMENT: 0-10

## 2019-05-08 ASSESSMENT — PAIN DESCRIPTION - DESCRIPTORS: DESCRIPTORS: ACHING

## 2019-05-08 NOTE — PROGRESS NOTES
Prior to discharge given verbal order per Dr. Sharyle Shaggy to place scopolamine patch. Orders placed in Epic and administered per orders. Pt's daughter updated and education on patch. Pt states her nausea has improved and she is ready to go home. Discharge instructions reviewed with the daughter - verbalized understanding. Pt / family denies any further questions / concerns. Discharged per wheelchair to car. Assessment remains unchanged.

## 2019-05-08 NOTE — PROGRESS NOTES
Pt c/o nausea. One dose of Zofran given, reported to Dr Nigel Lau. Order for Phenergan received. Phenergan 6.25mg IVP given.   Cont to monitor

## 2019-05-08 NOTE — ANESTHESIA PRE PROCEDURE
Provider Last Rate Last Dose    ceFAZolin (ANCEF) 2 g in dextrose 3 % 50 mL IVPB (duplex)  2 g Intravenous Once Monico Hernandez,         lactated ringers infusion   Intravenous Continuous Kalli Bolanos MD        sodium chloride flush 0.9 % injection 10 mL  10 mL Intravenous 2 times per day Kalli Bolanos MD        sodium chloride flush 0.9 % injection 10 mL  10 mL Intravenous PRN Kalli Bolanos MD        lidocaine PF 1 % injection 1 mL  1 mL Intradermal Once PRN Kalli Bolanos MD        acetaminophen (OFIRMEV) infusion 1,000 mg  1,000 mg Intravenous Once Lydia Dye DO        EPINEPHrine 1 MG/ML injection                Allergies:     Allergies   Allergen Reactions    Plaquenil [Hydroxychloroquine Sulfate] Shortness Of Breath    Demerol Other (See Comments)     HALLUCINATE       Problem List:    Patient Active Problem List   Diagnosis Code    Pulmonary sarcoidosis ([address] D86.0    RA (rheumatoid arthritis) ([address] M06.9    Primary osteoarthritis of right knee M17.11    Status post total right knee replacement Z96.651    Lumbar radiculopathy M54.16    Tear of left rotator cuff M75.102    Carpal tunnel syndrome of left wrist G56.02    Numbness and tingling in left hand R20.0, R20.2       Past Medical History:        Diagnosis Date    Anemia     Arthritis     Bronchiectasis ([address]     Diabetes mellitus ([address]     Hypertension     Lung disease     Mediastinal lymphadenopathy     Sarcoidosis        Past Surgical History:        Procedure Laterality Date    APPENDECTOMY      CARPAL TUNNEL RELEASE      CATARACT REMOVAL WITH IMPLANT Right 04/03/2019    EYE SURGERY Left 04/24/2019    PHACO EMULSIFICATION OF CATARACT WITH INTRAOCULAR LENS IMPLANT EYE     INTRACAPSULAR CATARACT EXTRACTION Right 4/3/2019    PHACO EMULSIFICATION OF CATARACT WITH INTRAOCULAR LENS IMPLANT EYE performed by Karalee Nageotte, MD at [address] [place] [address] Left 4/24/2019    PHACO EMULSIFICATION OF CATARACT WITH INTRAOCULAR LENS IMPLANT EYE performed by Torsten Loving MD at [address] [facility] OTHER SURGICAL HISTORY  04/16/2018    right total knee replacement    HI NJX DX/THER SBST INTRLMNR CRV/THRC W/IMG GDN Left 8/24/2018    LEFT LUMBAR FIVE SACRAL ONE EPIDURAL STEROID INJECTION SITE CONFIRMED BY FLUOROSCOPY performed by Fred Aguirre MD at [facility]      RIGHT       Social History:    Social History     Tobacco Use    Smoking status: Never Smoker    Smokeless tobacco: Never Used   Substance Use Topics    Alcohol use: No     Alcohol/week: 0.0 oz                                Counseling given: Not Answered      Vital Signs (Current):   Vitals:    05/03/19 1304   Weight: 235 lb (106.6 kg)   Height: 5' 3.5\" (1.613 m)                                              BP Readings from Last 3 Encounters:   04/24/19 (!) 143/71   04/24/19 (!) 119/52   04/03/19 (!) 147/70       NPO Status: Time of last liquid consumption: 2100                        Time of last solid consumption: 2100                        Date of last liquid consumption: 05/07/19                        Date of last solid food consumption: 05/07/19    BMI:   Wt Readings from Last 3 Encounters:   05/03/19 235 lb (106.6 kg)   04/25/19 235 lb 0.2 oz (106.6 kg)   04/16/19 235 lb (106.6 kg)     Body mass index is 40.98 kg/m².     CBC:   Lab Results   Component Value Date    WBC 8.9 04/19/2018    RBC 3.70 04/19/2018    HGB 11.1 04/19/2018    HCT 33.1 04/19/2018    MCV 89.5 04/19/2018    RDW 15.8 04/19/2018     04/19/2018       CMP:   Lab Results   Component Value Date     04/19/2018    K 3.3 04/19/2018     04/19/2018    CO2 30 04/19/2018    BUN 12 04/19/2018    CREATININE 0.7 04/19/2018    GFRAA >60 04/19/2018    GFRAA >60 08/20/2010    AGRATIO 1.6 12/17/2015    LABGLOM >60 04/19/2018    GLUCOSE 127 04/19/2018    PROT 6.6 12/17/2015 PROT 6.9 12/17/2015    PROT 6.5 05/10/2011    CALCIUM 8.3 04/19/2018    BILITOT 0.3 12/17/2015    BILITOT 0.3 12/17/2015    ALKPHOS 85 12/17/2015    ALKPHOS 84 12/17/2015    AST 15 12/17/2015    AST 16 12/17/2015    ALT 14 12/17/2015    ALT 14 12/17/2015       POC Tests: No results for input(s): POCGLU, POCNA, POCK, POCCL, POCBUN, POCHEMO, POCHCT in the last 72 hours. Coags:   Lab Results   Component Value Date    PROTIME 11.8 03/28/2018    INR 1.04 03/28/2018    APTT 27.4 03/28/2018       HCG (If Applicable): No results found for: PREGTESTUR, PREGSERUM, HCG, HCGQUANT     ABGs: No results found for: PHART, PO2ART, AVM6TZR, EIQ5ZXM, BEART, V9HXZEDU     Type & Screen (If Applicable):  No results found for: [facility]    Anesthesia Evaluation  Patient summary reviewed and Nursing notes reviewed no history of anesthetic complications:   Airway: Mallampati: II     Neck ROM: full   Dental:          Pulmonary:Negative Pulmonary ROS and normal exam                               Cardiovascular:Negative CV ROS    (+) hypertension:,                   Neuro/Psych:   Negative Neuro/Psych ROS  (+) neuromuscular disease (CTS radiculopathy):,             GI/Hepatic/Renal: Neg GI/Hepatic/Renal ROS       (-) hiatal hernia and GERD       Endo/Other: Negative Endo/Other ROS   (+) Diabetes, : arthritis: rheumatoid. , .                 Abdominal:           Vascular:                                        Anesthesia Plan      general     ASA 3     (I discussed with the patient the risks and benefits of PIV, general anesthesia, IV Narcotics, PACU. All questions were answered the patient agrees with the plan.)  Induction: intravenous. Pre-Operative Diagnosis: TEAR OF LEFT ROTATOR CUFF, LEFT CARPAL TUNNEL SYNDROME    70 y.o.   BMI:  Body mass index is 41.63 kg/m².      Vitals:    05/03/19 1304 05/08/19 0800 05/08/19 0807   BP:  (!) 140/77 (!) 145/84   Pulse:  66 68   Resp:  14 20   Temp:  97.4 °F (36.3 °C)    TempSrc:

## 2019-05-08 NOTE — OP NOTE
Diagnostic Shoulder Arthroscopy with Rotator Cuff Repair     All Monge (1955)    [facility]#:  [id number]    Date of Surgery- 5/8/2019      Preoperative Diagnosis-  1. Rotator cuff tear left shoulder           2. Outlet impingement left shoulder           3. Left Carpal Tunnel Syndrome      4. Biceps tendonitis fraying left shoulder                                            Postoperative Diagnosis-  1. Rotator cuff tear left shoulder           2. Outlet impingement left shoulder       3. Left Carpal Tunnel Syndrome      4. Biceps tendonitis fraying left shoulder    Procedure-   1. Diagnostic arthroscopy left shoulder (CPT 07090)            2. Arthroscopic rotator cuff repair left shoulder ([id number])    3. Subacromial decompression (CPT- Y837986)    4. Left Carpal Tunnel Release (CPT R9715238)    5. Biceps tenotomy (CPT- 09515)      Surgeon-  Lorenza Dan DO    Primary Assistant- Mitch Funes SA    Anesthesia- [facility]    EBL: minimal    Tourniquet time: 5min    Implants- Depuy Mitek Healix 4.5 mm, quantity # 2; Healix Knotless, quantity # 2    Indications for Operation  Persistent shoulder pain with an MRI confirmed rotator cuff tear, evidence of carpal tunnel syndrome clinically and on EMG. The patient chose to proceed with the aforementioned procedures. At no time were any guarantees implied or stated. The patient understands the relevant risks, benefits, limitations, and healing process of the procedure. Site Marking and Surgical Prep  The patient was seen in the holding area and the appropriate extremity marked with a pen. Interscalene block was administered by the anesthesia department. The patient was taken to the operative suite, identified, placed on the operating room table in the supine position.   After induction of general anesthesia, the patient was placed in the beach chair position with all bony prominences adequately padded and the head and neck anatomically supported. The upper extremity from the neck to fingertips was prepped with Hibiclens and alcohol and draped in the standard fashion. Examination  Under Anesthesia. Full symmetric range of motion, no instability    Carpal tunnel release: A well-padded tourniquet was applied to the upper arm. The arm was exsanguinated and the tourniquet elevated to 250 mmHg. A standard incision was made in the palm of the hand. Dissection carried down through skin, subcutaneous tissue, and palmar fascia. The transverse carpal ligament was identified and incised proximally. A groove director was inserted to protect the contents of the carpal tunnel at all times. After distal release was performed, attention was directed proximally. Under direct visualization the proximal aspect was also released. A fingertip could be inserted to insure adequate proximal and distal decompression. The contents of the carpal tunnel were examined and found to be consistent with changes of median nerve compression. The tourniquet was released and hemostasis achieved with bipolar electrocautery. The wounds were irrigated with sterile saline. Skin was closed with 4-0 nylon suture. A soft, bulky dressing was applied and the patient had good perfusion to all fingertips. Diagnostic Arthroscopy  The forearm was well padded and secured in the [employer] Spider extremity positioning device. A standard midglenoid posterior portal was established. The trocar and cannula were inserted. The trocar was removed and the arthroscope and inflow inserted. Systematic arthroscopy was performed and the findings are summarized below. Standard anterior portal was made with outside in technique. 1.  Superior Labrum/Biceps Tendon-  Intact with biceps fraying. No evidence of labral fraying  2. Anterior/Posterior Labrum- Intact with minimal degenerative fraying  3. Rotator Cuff- The subscapularis tendon was intact.   There was a full thickness tear of the supraspinatus tendon  4. Inferior Axillary Recess-  No loose bodies  5. Articular Surfaces-  Intact    Intra-articular Debridement  The biceps tendon was tenotomized from its origin on the supraglenoid tubercle. Undersurface of the rotator cuff and labrum was debrided. Subacromial Decompression  The trocar and cannula were redirected to the subacromial space. The arthroscope and inflow were inserted. A lateral portal was established after localizing the subacromial space with a spinal needle. Using a both a shaver and a radiofrequency probe, the bursa and the tissue beneath the acromion were removed. The coracromial ligament was divided. A 5.5 mm bur was placed through the lateral portal and the anterior aspect of the acromion was removed. The arthroscope was then switched to the lateral portal and with the bur posteriorly, a smooth flat acromion was created. The arm was abducted to 90 degrees and internally and externally rotated. There was no evidence of outlet impingement. Rotator Cuff Repair  Hypertrophic bursa was debrided. The supraspinatus tendon tear was exposed and the configuration identified. The tendon was mobilized to achieve a tension free repair. The greater tuberosity was abraded to a bleeding bony surface. Marrow stimulation was performed with chondral picks. Two medial row anchors were placed. Sutures were retrieved in a horizontal fashion using a retrograde technique. Suture limbs were isolated and placed laterally in two Healix Knotless anchors to create a transosseus equivalent repair. The repair was under minimal tension. The arm was abducted, internally and externally rotated. Threre was no evidence of impingement. Closure  The shoulder was drained. Arthroscopic equipment was removed and the portals closed with 3-0 Nylon. Sterile dressings were applied. A sling was applied.  The patient was awakened and taken to the postoperative area in stable condition.       Karly Padilla

## 2019-05-08 NOTE — ANESTHESIA POSTPROCEDURE EVALUATION
Department of Anesthesiology  Postprocedure Note    Patient: Patrica Agosto  MRN: [id number]  YOB: 1955  Date of evaluation: 5/8/2019  Time:  2:17 PM     Procedure Summary     Date:  05/08/19 Room / Location:  [facility] OR 01 / Fela Alvarez    Anesthesia Start:  5904 Anesthesia Stop:  1060    Procedure:  LEFT SHOULDER VIDEO ARTHROSCOPY, ROTATOR CUFF REPAIR, SUBACROMIAL DECOMPRESSION, BICEPS TENOTOMY, LEFT CARPAL TUNNEL RELEASE (Left Shoulder) Diagnosis:  (TEAR OF LEFT ROTATOR CUFF, LEFT CARPAL TUNNEL SYNDROME)    Surgeon:  Shreyas Morocho DO Responsible Provider:  Radha Delgadillo MD    Anesthesia Type:  general ASA Status:  3          Anesthesia Type: general    Salvador Phase I: Salvador Score: 9    Salvador Phase II: Salvador Score: 10    Last vitals: Reviewed and per EMR flowsheets.        Anesthesia Post Evaluation    Comments: Postoperative Anesthesia Note    Name:    Patrica Agosto  MRN:      [id number]    Patient Vitals in the past 12 hrs:  05/08/19 1330, BP:136/76, Pulse:86, Resp:18, SpO2:95 %  05/08/19 1315, BP:136/76, Pulse:80, Resp:18, SpO2:95 %  05/08/19 1300, BP:(!) 152/85, Pulse:75, Resp:16, SpO2:99 %  05/08/19 1245, BP:137/86, Pulse:79, Resp:12, SpO2:98 %  05/08/19 1230, BP:(!) 140/80, Pulse:76, Resp:14, SpO2:99 %  05/08/19 1215, BP:131/86, Pulse:82, Resp:19, SpO2:98 %  05/08/19 1200, BP:138/69, Pulse:82, Resp:20, SpO2:92 %  05/08/19 1148, BP:133/76, Pulse:70, Resp:18, SpO2:99 %  05/08/19 1133, BP:131/79, Pulse:65, Resp:16, SpO2:95 %  05/08/19 1128, BP:125/70, Pulse:67, Resp:14, SpO2:95 %  05/08/19 1123, BP:122/70, Pulse:66, Resp:14, SpO2:96 %  05/08/19 1118, BP:125/75, Temp:97 °F (36.1 °C), Temp src:Temporal, Pulse:66, Resp:12, SpO2:96 %  05/08/19 0817, BP:(!) 144/77, Pulse:66, Resp:20, SpO2:95 %  05/08/19 0812, BP:(!) 148/57, Pulse:67, Resp:20, SpO2:98 %  05/08/19 0807, BP:(!) 145/84, Pulse:68, Resp:20, SpO2:97 %  05/08/19 0800, BP:(!) 140/77, Temp:97.4 °F (36.3 °C), Temp src:Temporal,

## 2019-05-08 NOTE — H&P
I have reviewed the history and physical and examined the patient and find no relevant changes. I have reviewed with the patient and/or family the risks, benefits, and alternatives to the procedure.     Chayo Page DO  5/8/2019

## 2019-05-09 DIAGNOSIS — M75.102 TEAR OF LEFT ROTATOR CUFF, UNSPECIFIED TEAR EXTENT: Primary | ICD-10-CM

## 2019-05-14 ENCOUNTER — OFFICE VISIT (OUTPATIENT)
Dept: ORTHOPEDIC SURGERY | Age: 64
End: 2019-05-14

## 2019-05-14 ENCOUNTER — HOSPITAL ENCOUNTER (OUTPATIENT)
Dept: PHYSICAL THERAPY | Age: 64
Setting detail: THERAPIES SERIES
End: 2019-05-14
Payer: MEDICARE

## 2019-05-14 ENCOUNTER — HOSPITAL ENCOUNTER (OUTPATIENT)
Dept: PHYSICAL THERAPY | Age: 64
Setting detail: THERAPIES SERIES
Discharge: HOME | End: 2019-05-14
Payer: MEDICARE

## 2019-05-14 VITALS — BODY MASS INDEX: 41.64 KG/M2 | WEIGHT: 235.01 LBS | HEIGHT: 63 IN

## 2019-05-14 DIAGNOSIS — Z98.890 S/P CARPAL TUNNEL RELEASE: ICD-10-CM

## 2019-05-14 DIAGNOSIS — Z98.890 STATUS POST DECOMPRESSION OF SUBACROMIAL SPACE: ICD-10-CM

## 2019-05-14 DIAGNOSIS — Z98.890 S/P LEFT ROTATOR CUFF REPAIR: Primary | ICD-10-CM

## 2019-05-14 PROCEDURE — 97161 PT EVAL LOW COMPLEX 20 MIN: CPT | Performed by: PHYSICAL THERAPIST

## 2019-05-14 PROCEDURE — 99024 POSTOP FOLLOW-UP VISIT: CPT | Performed by: ORTHOPAEDIC SURGERY

## 2019-05-14 PROCEDURE — 97110 THERAPEUTIC EXERCISES: CPT | Performed by: PHYSICAL THERAPIST

## 2019-05-14 PROCEDURE — 97140 MANUAL THERAPY 1/> REGIONS: CPT | Performed by: PHYSICAL THERAPIST

## 2019-05-14 RX ORDER — HYDROCODONE BITARTRATE AND ACETAMINOPHEN 5; 325 MG/1; MG/1
1 TABLET ORAL EVERY 6 HOURS PRN
Qty: 28 TABLET | Refills: 0 | Status: SHIPPED | OUTPATIENT
Start: 2019-05-14 | End: 2019-05-14 | Stop reason: CLARIF

## 2019-05-14 NOTE — PROGRESS NOTES
HISTORY OF PRESENT ILLNESS: The patient returns today for the first postoperative visit after left shoulder video arthroscopy with rotator cuff repair, subacromial decompression and biceps tenotomy as well as left carpal tunnel release . Pain control has been satisfactory with oral medications. There have been no fevers or chills. DOS: May 8, 2019    PHYSICAL EXAMINATION: Inspection reveals expected swelling. Portal sites are clean and dry. The skin is warm. Range of motion is limited by pain and swelling. The distal neurovascular exam is grossly intact. Incision from carpal tunnel release is healing nicely. Patient is able to wiggle fingers flex and extend rest without difficulty. ASSESSMENT/PLAN: Doing well after left shoulder video arthroscopy with rotator cuff repair, subacromial decompression and biceps tenotomy as well as left carpal tunnel release . The UltraSling will be worn at all times except for hygiene and the prescribed exercises. Given the size of the tear, physical therapy will be delayed until the subsequent office evaluation. I have recommended ice and judicious use of narcotics. The sling will be worn at all times except for hygiene and the prescribed exercises. They will follow up in approximately six weeks for repeat evaluation. Diagnosis Orders   1. S/P left rotator cuff repair  HYDROcodone-acetaminophen (NORCO) 5-325 MG per tablet   2. Status post decompression of subacromial space     3.  S/P carpal tunnel release  HYDROcodone-acetaminophen (NORCO) 5-325 MG per tablet       Jayda Mills

## 2019-05-14 NOTE — PLAN OF CARE
[address],  [facility] [address] [facility] [place], [address] [facility] ([facility]), [address] [facility]  Phone: [phone], [phone]                                                    Physical Therapy Certification    Dear Referring Practitioner: Christie Schaefer,    We had the pleasure of evaluating the following patient for physical therapy services at [address] [place] [address]. A summary of our findings can be found in the initial assessment below. This includes our plan of care. If you have any questions or concerns regarding these findings, please do not hesitate to contact me at the office phone number checked above. Thank you for the referral.       Physician Signature:_______________________________Date:__________________  By signing above (or electronic signature), therapists plan is approved by physician      Patient: Qi Desir   : 1955   MRN: [id number]  Referring Physician: Referring Practitioner: Christie Schaefer      Evaluation Date: 2019      Medical Diagnosis Information:  Diagnosis: Left rotator cuff repair, subacromial decompression and biceps tenotomy, left carpal tunnel release (19)   Treatment Diagnosis: Veverly Flavia / [address] information: PT Insurance Information: Medicare    Precautions/ Contra-indications/Relevant Medical History:   Latex Allergy:  [x]NO      []YES   Preferred Language for Healthcare:   [x]English       []other:    SUBJECTIVE: Patient stated complaint: patient is s/p Left rotator cuff repair, subacromial decompression and biceps tenotomy as well as left carpal tunnel release on 19.      Functional Disability Index:   Quick Dash: 84%     Pain Scale: 7/10  Easing factors: rest, ice, pain meds  Provocative factors: bed mobility, sleeping, showering, car mobility     Type: [x]Constant   []Intermittent  []Radiating []Localized []other:     Numbness/Tingling: yes, in the left hand as a whole. Functional Limitations/Impairments: [x]Lifting/reaching []Grooming [x]Carrying    [x]ADL's []Driving []Sports/Recreations   []Other:    Occupation/School:  Retired     Living Status/Prior Level of Function: Independent with ADLs and IADLs, single point cane as needed. OBJECTIVE:     ROM Left Right   Shoulder Flex 45 °  105 °    Shoulder Abd  100 °    Shoulder ER 10 °     Shoulder IR     Elbow Flex     Elbow Ext     Wrist Flex     Wrist Ext     Strength  Left Right   Shoulder Flex NT ?  3+   Shoulder Scap  3+   Shoulder ER  3   Shoulder IR  3+   Elbow Flex     Elbow Ext     Wrist Flex     Wrist Ext     Julián        Reflexes/Sensation (myotomes/dermatomes):    []Normal    []Abnormal:      Joint mobility:    [x]Normal    []Hypo   []Hyper    Palpation/Observation: One suture remaining still. .. I communicated with Dr. Theodora Solo and she stated it was okay to remove. Functional Mobility/Transfers: Stand by assist with all transfers. Posture: Sling w/ pillow    Bandages/Dressings/Incisions: 1 suture removed and covered with steri strip. Other dressings removed by Dr. Theodora Solo' office. Gait (include devices/WB status):     Orthopedic Special Tests: NT    [x] Patient history, allergies, meds reviewed. Medical chart reviewed. See intake form. Review Of Systems (ROS):  [x]Performed Review of systems (Integumentary, CardioPulmonary, Neurological) by intake and observation. Intake form has been scanned into medical record. Patient has been instructed to contact their primary care physician regarding ROS issues if not already being addressed at this time.       Co-morbidities/Complexities (which will affect course of rehabilitation):   []None           Arthritic conditions   []Rheumatoid arthritis (M05.9)  [x]Osteoarthritis (M19.91)   Cardiovascular conditions   [x]Hypertension (I10)  []Hyperlipidemia (E78.5)  []Angina pectoris (I20)  []Atherosclerosis (I70)   Musculoskeletal conditions   []Disc elements   [] a total of 4 or more elements   [x] A clinical presentation with:  [x] stable and/or uncomplicated characteristics   [] evolving clinical presentation with changing characteristics  [] unstable and unpredictable characteristics;   [x] Clinical decision making of [x] low, [] moderate, [] high complexity using standardized patient assessment instrument and/or measurable assessment of functional outcome. [x] EVAL (LOW) 54849 (typically 20 minutes face-to-face)  [] EVAL (MOD) 49548 (typically 30 minutes face-to-face)  [] EVAL (HIGH) 38685 (typically 45 minutes face-to-face)  [] RE-EVAL     PLAN:  Frequency/Duration:  1-2 days per week for 12 weeks:  INTERVENTIONS:  [x]  Therapeutic exercise including: strength training, ROM, for upper extremity and core   [x]  NMR activation and proprioception for UE and Core   [x]  Manual therapy as indicated for UE and spine to include: Dry Needling/IASTM, STM, PROM, Gr I-IV mobilizations, manipulation. [x] Modalities as needed that may include: thermal agents, E-stim, Biofeedback, US, iontophoresis as indicated  [x] Patient education on joint protection, postural re-education, activity modification, progression of HEP. HEP instruction: (see scanned forms)    GOALS:    Therapist goals for Patient:   Short Term Goals: To be achieved in: 2 weeks  1. Independent in HEP and progression per patient tolerance, in order to prevent re-injury. 2. Patient will have a decrease in pain to facilitate improvement in movement, function, and ADLs as indicated by Functional Deficits. Long Term Goals: To be achieved in: 12 weeks  1. Disability index score of 10% or less for the Quick Dash to assist with reaching prior level of function. 2. Patient will demonstrate increased AROM to equal the opposite side bilaterally to allow for proper joint functioning as indicated by patients Functional Deficits.    3. Patient will demonstrate an increase in strength to UE MMT scores to match bilaterally and allow for proper functional mobility as indicated by patients Functional Deficits. 4. Patient will return to all transfers, work activities, and functional activities without increased symptoms or restriction.     5. Patient will have 0/10 pain with ADL's.  6. Patient stated goal: \"to be able to raise my arm without pain\"       Electronically signed by:  Brenda Crain, PT

## 2019-05-14 NOTE — FLOWSHEET NOTE
[address], [facility] ([facility])    Physical Therapy Daily Treatment Note  Date:  2019    Patient Name:  Reyna Reyes    :  1955  MRN: [id number]  Medical/Treatment Diagnosis Information:  · Diagnosis: Left rotator cuff repair, subacromial decompression and biceps tenotomy, left carpal tunnel release (19)  · Treatment Diagnosis: [place] / N73.330  Insurance/Certification information:  PT Insurance Information: Medicare  Physician Information:  Referring Practitioner: Mir Graham of care signed (Y/N):     Date of Patient follow up with Physician: 19    G-Code (if applicable):      Date G-Code Applied:  2019       Progress Note: [x]  Yes  []  No      Latex Allergy:  [x]NO      []YES   Preferred Language for Healthcare:   [x]English       []other:     Visit # Insurance Allowable   1 Med nec     Pain level:  5/10     SUBJECTIVE:  Patient will return on 19 for her follow up visit. She was told she \"didn't have to come back to therapy until 19\" but after some education regarding updating her HEP, passively ranging her shoulder, and the educational benefits to returning before that, she has agreed to return on 19.       OBJECTIVE: See eval  Observation:   Test measurements:    Patient educated on following:    RESTRICTIONS/PRECAUTIONS: Sling w/ pillow 6 weeks    Exercises/Interventions:   Therapeutic Ex Wt/Sets/Reps/Hold Notes   Pulley                         Scap pinches/shrugs x20 ea    pron/sup x20    Wrist flex/ext x20    Self passive flex/ER x15 ea    Self passive elbow flexion x15 ea    Ball squeeze x30              Suture removal/covering 10 min                        Manual     Gr I-II mobs for tissue reactivity 5 min Seated grade 1 mobs   PROM-all planes 5 min Seated flexion/ER   GR III-IV mobs for arthrokinematics     Cervical/long axis distraction     Thoracic PA mobs     PNF for strengthening     PNF for agonist/antagonist inhibition          Pt education 10 min Provided biomechanics/ergonomics training to reduce stress across injured/healing structures. Provided Education in post-operative precautions/contraindications. HEP. Return frequency/duration. Therapeutic Exercise and NMR EXR  [x] (62015) Provided verbal/tactile cueing for activities related to strengthening, flexibility, endurance, ROM  for improvements in scapular, scapulothoracic and UE control with self care, reaching, carrying, lifting, house/yardwork, driving/computer work. [x] (80329) Provided verbal/tactile cueing for activities related to improving balance, coordination, kinesthetic sense, posture, motor skill, proprioception  to assist with  scapular, scapulothoracic and UE control with self care, reaching, carrying, lifting, house/yardwork, driving/computer work.     Home Exercise Program:    [x] (47969) Reviewed/Progressed HEP activities related to strengthening, flexibility, endurance, ROM of scapular, scapulothoracic and UE control with self care, reaching, carrying, lifting, house/yardwork, driving/computer work  [x] (42326) Reviewed/Progressed HEP activities related to improving balance, coordination, kinesthetic sense, posture, motor skill, proprioception of scapular, scapulothoracic and UE control with self care, reaching, carrying, lifting, house/yardwork, driving/computer work      Manual Treatments:  PROM / STM / Oscillations-Mobs:  G-I, II, III, IV (PA's, Inf., Post.)  [x] (79950) Provided manual therapy to mobilize soft tissue/joints of cervical/CT, scapular GHJ and UE for the purpose of modulating pain, promoting relaxation,  increasing ROM, reducing/eliminating soft tissue swelling/inflammation/restriction, improving soft tissue extensibility and allowing for proper ROM for normal function with self care, reaching, carrying, lifting, house/yardwork, driving/computer work    Modalities:  na    Charges:  Timed Code Treatment Minutes: 40   Total Treatment Minutes:    Start Time [facility])  Stop Time (BWC)  Procedures (BWC) 60             [x] EVAL (LOW) 85903 (typically 20 minutes face-to-face)  [] EVAL (MOD) 22175 (typically 30 minutes face-to-face)  [] EVAL (HIGH) 81883 (typically 45 minutes face-to-face)  [] RE-EVAL     [x] VV(79700) x  2   [] Ionto  [] NMR (83436) x      [] ES (unattended)  [x] Manual (82679) x  1    [] Mech Traction  [] TA: x (71265)         [] Other:      GOALS:  Therapist goals for Patient:   Short Term Goals: To be achieved in: 2 weeks  1. Independent in HEP and progression per patient tolerance, in order to prevent re-injury. 2. Patient will have a decrease in pain to facilitate improvement in movement, function, and ADLs as indicated by Functional Deficits.     Long Term Goals: To be achieved in: 12 weeks  1. Disability index score of 10% or less for the Quick Dash to assist with reaching prior level of function. 2. Patient will demonstrate increased AROM to equal the opposite side bilaterally to allow for proper joint functioning as indicated by patients Functional Deficits. 3. Patient will demonstrate an increase in strength to UE MMT scores to match bilaterally and allow for proper functional mobility as indicated by patients Functional Deficits. 4. Patient will return to all transfers, work activities, and functional activities without increased symptoms or restriction. 5. Patient will have 0/10 pain with ADL's.  6. Patient stated goal: \"to be able to raise my arm without pain\"     Goals that are underlined signify the goal has been accomplished. Progression Towards Functional goals:  [] Patient is progressing as expected towards functional goals listed. [] Progression is slowed due to complexities listed. [] Progression has been slowed due to co-morbidities.   [x] Plan just implemented, too soon to assess goals progression  [] Other:     ASSESSMENT:  See eval    Treatment/Activity Tolerance:   [x] Patient tolerated treatment well [] Patient limited by fatique  [] Patient limited by pain  [] Patient limited by other medical complications  [] Other:     Prognosis: [] Good [] Fair  [] Poor    Patient Requires Follow-up: [x] Yes  [] No    PLAN: See eval  [] Continue per plan of care [] Alter current plan (see comments)  [x] Plan of care initiated [] Hold pending MD visit [] Discharge    Electronically signed by: Otilia Ruiz PT

## 2019-05-15 DIAGNOSIS — Z98.890 S/P CARPAL TUNNEL RELEASE: ICD-10-CM

## 2019-05-15 DIAGNOSIS — Z98.890 S/P LEFT ROTATOR CUFF REPAIR: ICD-10-CM

## 2019-05-15 DIAGNOSIS — Z98.890 STATUS POST DECOMPRESSION OF SUBACROMIAL SPACE: ICD-10-CM

## 2019-05-15 RX ORDER — HYDROCODONE BITARTRATE AND ACETAMINOPHEN 5; 325 MG/1; MG/1
1-2 TABLET ORAL EVERY 6 HOURS PRN
Qty: 28 TABLET | Refills: 0 | Status: SHIPPED | OUTPATIENT
Start: 2019-05-15 | End: 2019-05-15 | Stop reason: SDUPTHER

## 2019-05-15 RX ORDER — HYDROCODONE BITARTRATE AND ACETAMINOPHEN 5; 325 MG/1; MG/1
1 TABLET ORAL EVERY 6 HOURS PRN
Qty: 28 TABLET | Refills: 0 | Status: SHIPPED | OUTPATIENT
Start: 2019-05-15 | End: 2019-05-22

## 2019-06-05 ENCOUNTER — HOSPITAL ENCOUNTER (OUTPATIENT)
Dept: PHYSICAL THERAPY | Age: 64
Setting detail: THERAPIES SERIES
Discharge: HOME | End: 2019-06-05
Payer: MEDICARE

## 2019-06-05 PROCEDURE — 97140 MANUAL THERAPY 1/> REGIONS: CPT | Performed by: PHYSICAL THERAPY ASSISTANT

## 2019-06-05 PROCEDURE — 97112 NEUROMUSCULAR REEDUCATION: CPT | Performed by: PHYSICAL THERAPY ASSISTANT

## 2019-06-05 PROCEDURE — 97110 THERAPEUTIC EXERCISES: CPT | Performed by: PHYSICAL THERAPY ASSISTANT

## 2019-06-05 NOTE — FLOWSHEET NOTE
[address] [place] [address] [facility]    Physical Therapy Daily Treatment Note  Date:  2019    Patient Name:  Qi Desir    :  1955  MRN: [id number]  Medical/Treatment Diagnosis Information:  · Diagnosis: Left rotator cuff repair, subacromial decompression and biceps tenotomy, left carpal tunnel release (19)  · Treatment Diagnosis: M75.102 / C62.830  Insurance/Certification information:  PT Insurance Information: Medicare  Physician Information:  Referring Practitioner: Leisa Gilbert of care signed (Y/N):     Date of Patient follow up with Physician: 19    G-Code (if applicable):      Date G-Code Applied:  2019       Progress Note: [x]  Yes  []  No      Latex Allergy:  [x]NO      []YES   Preferred Language for Healthcare:   [x]English       []other:     Visit # Insurance Allowable   2 Med nec     Pain level:  510     SUBJECTIVE: Patient states that she has been doing HEP - but also makes note that she has been cooking, helping farm hands with errands, and attempting to hoe garden with one arm. Patient also wants her abduction pillow removed.  (Per MD/AA - she is allowed to take the pillow out but needs to continue use of sling)    OBJECTIVE: See eval  Observation:   Test measurements:  PROM Flex 90   ER 30-40  Patient educated on following:    RESTRICTIONS/PRECAUTIONS: Sling w/ pillow 6 weeks    Exercises/Interventions:   Therapeutic Ex Wt/Sets/Reps/Hold Notes   Pulley                         Scap pinches/shrugs x20 ea Requires heavy cue   pron/sup x20    Wrist flex/ext x20    Self passive flex/ER with cane x15 ea Flexion performed in bent over position to allow gravity assistance   Self passive elbow flexion x15 ea    Ball squeeze x30                                       Manual     Gr I-II mobs for tissue reactivity 15 min Supine with wedge grade 1-2 mobs   PROM-all planes 15 min Supine with wedge flexion/ER   GR III-IV mobs for arthrokinematics Cervical/long axis distraction     Thoracic PA mobs     PNF for strengthening     PNF for agonist/antagonist inhibition          Pt education 10 min Provided biomechanics/ergonomics training to reduce stress across injured/healing structures. Provided Education in post-operative precautions/contraindications. HEP. Return frequency/duration. Therapeutic Exercise and NMR EXR  [x] (54733) Provided verbal/tactile cueing for activities related to strengthening, flexibility, endurance, ROM  for improvements in scapular, scapulothoracic and UE control with self care, reaching, carrying, lifting, house/yardwork, driving/computer work. [x] (76830) Provided verbal/tactile cueing for activities related to improving balance, coordination, kinesthetic sense, posture, motor skill, proprioception  to assist with  scapular, scapulothoracic and UE control with self care, reaching, carrying, lifting, house/yardwork, driving/computer work.     Home Exercise Program:    [x] (64247) Reviewed/Progressed HEP activities related to strengthening, flexibility, endurance, ROM of scapular, scapulothoracic and UE control with self care, reaching, carrying, lifting, house/yardwork, driving/computer work  [x] (58214) Reviewed/Progressed HEP activities related to improving balance, coordination, kinesthetic sense, posture, motor skill, proprioception of scapular, scapulothoracic and UE control with self care, reaching, carrying, lifting, house/yardwork, driving/computer work      Manual Treatments:  PROM / STM / Oscillations-Mobs:  G-I, II, III, IV (PA's, Inf., Post.)  [x] (69704) Provided manual therapy to mobilize soft tissue/joints of cervical/CT, scapular GHJ and UE for the purpose of modulating pain, promoting relaxation,  increasing ROM, reducing/eliminating soft tissue swelling/inflammation/restriction, improving soft tissue extensibility and allowing for proper ROM for normal function with self care, reaching, carrying, lifting, house/yardwork, driving/computer work    Modalities:  V pulse 10 min  Charges:  Timed Code Treatment Minutes: 54'   Total Treatment Minutes:    Start Time (BWC)  Stop Time (BWC)  Procedures (BWC) 72'             [] EVAL (LOW) 455 1011 (typically 20 minutes face-to-face)  [] EVAL (MOD) 64956 (typically 30 minutes face-to-face)  [] EVAL (HIGH) 423 8935 (typically 45 minutes face-to-face)  [] RE-EVAL     [x] VQ(54501) x  1   [] Ionto  [x] NMR (73901) x  1   [] ES (unattended)  [x] Manual ([id number]) x  2    [] Mech Traction  [] TA: x (97925)         [] Other:      GOALS:  Therapist goals for Patient:   Short Term Goals: To be achieved in: 2 weeks  1. Independent in HEP and progression per patient tolerance, in order to prevent re-injury. 2. Patient will have a decrease in pain to facilitate improvement in movement, function, and ADLs as indicated by Functional Deficits.     Long Term Goals: To be achieved in: 12 weeks  1. Disability index score of 10% or less for the Quick Dash to assist with reaching prior level of function. 2. Patient will demonstrate increased AROM to equal the opposite side bilaterally to allow for proper joint functioning as indicated by patients Functional Deficits. 3. Patient will demonstrate an increase in strength to UE MMT scores to match bilaterally and allow for proper functional mobility as indicated by patients Functional Deficits. 4. Patient will return to all transfers, work activities, and functional activities without increased symptoms or restriction. 5. Patient will have 0/10 pain with ADL's.  6. Patient stated goal: \"to be able to raise my arm without pain\"     Goals that are underlined signify the goal has been accomplished. Progression Towards Functional goals:  [] Patient is progressing as expected towards functional goals listed. [x] Progression is slowed due to complexities listed. [] Progression has been slowed due to co-morbidities.   [] Plan just implemented, too soon to assess goals progression  [] Other:     ASSESSMENT:  Patient requires heavy cue for relaxing, proper performance of HEP, and the sling was readjusted without pillow.     Treatment/Activity Tolerance:   [x] Patient tolerated treatment well [] Patient limited by fatique  [] Patient limited by pain  [] Patient limited by other medical complications  [] Other:     Prognosis: [] Good [] Fair  [] Poor    Patient Requires Follow-up: [x] Yes  [] No    PLAN: See eval  [] Continue per plan of care [] Alter current plan (see comments)  [x] Plan of care initiated [] Hold pending MD visit [] Discharge    Electronically signed by: Cherelle Jules PTA

## 2019-06-11 ENCOUNTER — HOSPITAL ENCOUNTER (OUTPATIENT)
Dept: PHYSICAL THERAPY | Age: 64
Setting detail: THERAPIES SERIES
Discharge: HOME | End: 2019-06-11
Payer: MEDICARE

## 2019-06-11 PROCEDURE — 97140 MANUAL THERAPY 1/> REGIONS: CPT | Performed by: PHYSICAL THERAPY ASSISTANT

## 2019-06-11 PROCEDURE — 97110 THERAPEUTIC EXERCISES: CPT | Performed by: PHYSICAL THERAPY ASSISTANT

## 2019-06-11 PROCEDURE — 97112 NEUROMUSCULAR REEDUCATION: CPT | Performed by: PHYSICAL THERAPY ASSISTANT

## 2019-06-11 NOTE — FLOWSHEET NOTE
[address], [facility] ([facility])    Physical Therapy Daily Treatment Note  Date:  2019    Patient Name:  Yusuf Barber    :  1955  MRN: [id number]  Medical/Treatment Diagnosis Information:  · Diagnosis: Left rotator cuff repair, subacromial decompression and biceps tenotomy, left carpal tunnel release (19)  · Treatment Diagnosis: Lashon Sober / T33.611  Insurance/Certification information:  PT Insurance Information: Medicare  Physician Information:  Referring Practitioner: Amina Turner signed (Y/N):     Date of Patient follow up with Physician: 19    G-Code (if applicable):      Date G-Code Applied:  2019       Progress Note: [x]  Yes  []  No      Latex Allergy:  [x]NO      []YES   Preferred Language for Healthcare:   [x]English       []other:     Visit # Insurance Allowable   3 Med nec     Pain level:  5/10     SUBJECTIVE:  (19 - Per MD/AA - she is allowed to take the pillow out but needs to continue use of sling) Patient reports that she feels things are getting a little better as far as movement but she continues to hurt a lot.  (Patient has been cautioned on several occasions to not lift the arm actively and cautioned against overuse at home - also has been educated to use ice for inflammation at home but patient denies doing so)    OBJECTIVE: See eval  Observation:   Test measurements:  PROM Flex 90   ER 30-40  Patient educated on following:    RESTRICTIONS/PRECAUTIONS: Sling w/ pillow 6 weeks    Exercises/Interventions:   Therapeutic Ex Wt/Sets/Reps/Hold Notes   Pulley                         Scap pinches/shrugs x30 ea Requires heavy cue   pron/sup x20    Wrist flex/ext x20    Self passive flex/ER with cane x20 ea Flexion performed in bent over position to allow gravity assistance   Elbow bicep curl 3 way x15 ea    Ball squeeze Red digiflex 5\" x20              Table slides 3 way 10x10\" Pain free - passive motion Manual     Gr I-II mobs for tissue reactivity 15 min Supine with wedge grade 1-2 mobs   PROM-all planes 15 min Supine with wedge flexion/ER   GR III-IV mobs for arthrokinematics     Cervical/long axis distraction     Thoracic PA mobs     PNF for strengthening     PNF for agonist/antagonist inhibition          Pt education 10 min Provided biomechanics/ergonomics training to reduce stress across injured/healing structures. Provided Education in post-operative precautions/contraindications. HEP. Return frequency/duration. Therapeutic Exercise and NMR EXR  [x] (73962) Provided verbal/tactile cueing for activities related to strengthening, flexibility, endurance, ROM  for improvements in scapular, scapulothoracic and UE control with self care, reaching, carrying, lifting, house/yardwork, driving/computer work. [x] (73049) Provided verbal/tactile cueing for activities related to improving balance, coordination, kinesthetic sense, posture, motor skill, proprioception  to assist with  scapular, scapulothoracic and UE control with self care, reaching, carrying, lifting, house/yardwork, driving/computer work.     Home Exercise Program:    [x] (90336) Reviewed/Progressed HEP activities related to strengthening, flexibility, endurance, ROM of scapular, scapulothoracic and UE control with self care, reaching, carrying, lifting, house/yardwork, driving/computer work  [x] (39613) Reviewed/Progressed HEP activities related to improving balance, coordination, kinesthetic sense, posture, motor skill, proprioception of scapular, scapulothoracic and UE control with self care, reaching, carrying, lifting, house/yardwork, driving/computer work      Manual Treatments:  PROM / STM / Oscillations-Mobs:  G-I, II, III, IV (PA's, Inf., Post.)  [x] (01093) Provided manual therapy to mobilize soft tissue/joints of cervical/CT, scapular GHJ and UE for the purpose of modulating pain, promoting relaxation,  increasing ROM, progressing as expected towards functional goals listed. [x] Progression is slowed due to complexities listed. [] Progression has been slowed due to co-morbidities. [] Plan just implemented, too soon to assess goals progression  [] Other:     ASSESSMENT:  Patient requires max cue to relax and needs reminded to not actively move the arm on numerous occasions.     Treatment/Activity Tolerance:   [x] Patient tolerated treatment well [] Patient limited by fatique  [] Patient limited by pain  [] Patient limited by other medical complications  [] Other:     Prognosis: [] Good [] Fair  [] Poor    Patient Requires Follow-up: [x] Yes  [] No    PLAN: See eval  [x] Continue per plan of care [] Alter current plan (see comments)  [] Plan of care initiated [] Hold pending MD visit [] Discharge    Electronically signed by: Paul Anne PTA

## 2019-06-18 ENCOUNTER — HOSPITAL ENCOUNTER (OUTPATIENT)
Dept: PHYSICAL THERAPY | Age: 64
Setting detail: THERAPIES SERIES
End: 2019-06-18
Payer: MEDICARE

## 2019-06-25 ENCOUNTER — OFFICE VISIT (OUTPATIENT)
Dept: ORTHOPEDIC SURGERY | Age: 64
End: 2019-06-25

## 2019-06-25 ENCOUNTER — HOSPITAL ENCOUNTER (OUTPATIENT)
Dept: PHYSICAL THERAPY | Age: 64
Setting detail: THERAPIES SERIES
Discharge: HOME | End: 2019-06-25
Payer: MEDICARE

## 2019-06-25 VITALS — HEIGHT: 63 IN | BODY MASS INDEX: 41.64 KG/M2 | WEIGHT: 235.01 LBS

## 2019-06-25 DIAGNOSIS — Z98.890 STATUS POST DECOMPRESSION OF SUBACROMIAL SPACE: ICD-10-CM

## 2019-06-25 DIAGNOSIS — Z98.890 S/P CARPAL TUNNEL RELEASE: ICD-10-CM

## 2019-06-25 DIAGNOSIS — Z98.890 S/P LEFT ROTATOR CUFF REPAIR: Primary | ICD-10-CM

## 2019-06-25 PROCEDURE — 97110 THERAPEUTIC EXERCISES: CPT | Performed by: PHYSICAL THERAPY ASSISTANT

## 2019-06-25 PROCEDURE — 99024 POSTOP FOLLOW-UP VISIT: CPT | Performed by: ORTHOPAEDIC SURGERY

## 2019-06-25 PROCEDURE — 97140 MANUAL THERAPY 1/> REGIONS: CPT | Performed by: PHYSICAL THERAPY ASSISTANT

## 2019-06-25 PROCEDURE — 97112 NEUROMUSCULAR REEDUCATION: CPT | Performed by: PHYSICAL THERAPY ASSISTANT

## 2019-06-25 NOTE — PROGRESS NOTES
[address] [facility] and [address] [place] [address], [address] [place] [address] 904 [facility], [address] [place] [address]  Phone: [phone], [phone]    Date: 2019          Patient Name; :  Sanaz Frankel; 1955   Dx/ICD Code: Diagnosis: Left rotator cuff repair, subacromial decompression and biceps tenotomy, left carpal tunnel release (19)  Treatment Diagnosis: Bria Zan / M25.512          Physician: Dr. Marisol Brown        Total PT Visits: 4     Measures Previous Current   Pain (0-10)  7/10   Disability %     ROM  PROM flexion 127     ER at 45 degrees = 40 degrees             Strength                 Specific Functional Improvements & Impressions:   Mathew is making slow progress in therapy. Her ROM continues to be very limited although slowing gradual improvement. She is very active at home and we have reviewed the importance of following the restrictions of her protocol. She was on vacation last week and had her daughters wedding over the weekend so she has been busy and limited with her HEP. Treatment to date:  [x] Therapeutic Exercise including HEP instruction, [x] Neuromuscular Re-education  [x] Manual Therapy & Modalities to include  [] Cold/hotpack, []Electrical Stimulation, []Ultrasound,  [] Traction, [] Other                         ?    Assessment:  LE Functional deficiencies/Impairments:     []Decreased core/proximal hip strength and neuromuscular control -Reduced overall functional level with mobility and lifting    []Decreased LE functional strength- Reduced overall functional mobility    []Difficulty /Pain with sitting/standing- Reduced overall functional mobility    []Pain/difficulty with transfers/bed mobility- Reduced overall functional mobility  []Reduced balance/proprioceptive control- Reduced overall functional level with mobility and gait, possible falls risk   []Pain/difficulty with ambulation- Reduced overall functional mobility  [] Unable to perform self care tasks due to pain and dysfunction- Reduced ADL status  [] Pain/difficulty with occupational or household work- Reduced ADL status    []Unable to perform sport/recreational activity due to pain and dysfunction    UE Functional deficiencies/Impairments   [x]Decreased RC/scapular/core strength and neuromuscular control - Reduced overall functional level with carrying /lifting   []Noted cervical/thoracic/GHJ/joint hypomobility- Reduced overall functional level with carrying /lifting    [x]Decreased UE functional strength- Reduced overall functional mobility with carrying/lifting/self care   [x]Pain/difficulty with driving and/or computer work- Reduced overall functional  level    [x]Pain/difficulty associated with self care tasks- Reduced overall functional level and ADL status   [x]Pain/difficulty with lifting/reaching/carrying - Reduced overall functional level with carrying and lifting  [x]Pain/difficulty with occupational or household work- Reduced ADL status   []Unable to perform sport/recreational activity due to pain and dysfunction    G-Code if applicable noted on 6/67/6493 based on following test/measure:        Prognosis:   []Excellent   [x] Good   [] Fair   [] Poor    Plan & Recommendations:  [x] Continue rehabilitation due to objective improvement and continued functional deficits with frequency and duration: 2 times a week for 8-12 weeks with treatment to include:  [x] Therapeutic Exercise including HEP instruction, [x] Neuromuscular Re-education [x] Manual Therapy & Modalities to include  [] Cold/hotpack, []Electrical Stimulation, []Ultrasound,  [] Traction, [] Other    [] Progress toward  []GAP, []Work Conditioning, []Independent HEP   [] Discharge due to   [] All goals achieved, [] Maximized \"medical necessity\" [] No subjective or objective improvements      Electronically signed by:  Zeny Rodrigues PTA / Keith Estrada, PT, DPT, OMT-CMadi  [id number]        Therapy Plan of Care Re-Certification  This patient has been re-evaluated for physical therapy services and for therapy to continue, Medicare, Medicaid and other insurances require periodic physician review of the treatment plan. Please review the above re-evaluation and verify that you agree with plan of care as established above by signing the attached document and return it to our office or note changes to established plan below  [] Follow treatment plan as above [] Discontinue physical therapy  [] Change plan to:                                 __________________________________________________      Physician Signature:____________________________________ Date:____________  By signing above, therapists plan is approved by physician    If you have any questions or concerns, please don't hesitate to call.   Thank you for your referral.       [facility] office  ([phone])     Fax [phone]

## 2019-06-25 NOTE — FLOWSHEET NOTE
[address], [facility] ([facility])    Physical Therapy Daily Treatment Note  Date:  2019    Patient Name:  Kelsey Arana    :  1955  MRN: [id number]  Medical/Treatment Diagnosis Information:  · Diagnosis: Left rotator cuff repair, subacromial decompression and biceps tenotomy, left carpal tunnel release (19)  · Treatment Diagnosis: Shayna Mojica / E63.675  Insurance/Certification information:  PT Insurance Information: Medicare  Physician Information:  Referring Practitioner: Gracy Falcon of care signed (Y/N):     Date of Patient follow up with Physician: 19    G-Code (if applicable):      Date G-Code Applied:  2019       Progress Note: [x]  Yes  []  No      Latex Allergy:  [x]NO      []YES   Preferred Language for Healthcare:   [x]English       []other:     Visit # Insurance Allowable   4 Med nec     Pain level:  7/10     SUBJECTIVE:  Doing OK - has been out of town the past week and also had her daughters wedding over the weekend. Still continues to be very sore with limited motion.    OBJECTIVE: See eval  Observation:   Test measurements:  PROM Flex 127   ER at 45= 40 degrees  Patient educated on following:    RESTRICTIONS/PRECAUTIONS: Sling w/ pillow 6 weeks    Exercises/Interventions:   Therapeutic Ex Wt/Sets/Reps/Hold Notes   Pulley 5'              Isometrics  4 way 10x10\" ea         Scap pinches/shrugs x30 ea Requires heavy cue   pron/sup x20    Wrist flex/ext x20    Self passive flex/ER with cane x20 ea Flexion performed in bent over position to allow gravity assistance   Elbow bicep curl 3 way x15 ea    Ball squeeze Red digiflex 5\" x20              Table slides 3 way 10x10\" Pain free - passive motion                       Manual     Gr I-II mobs for tissue reactivity 15 min Supine with wedge grade 1-2 mobs   PROM-all planes 15 min Supine with wedge flexion/ER   GR III-IV mobs for arthrokinematics     Cervical/long axis distraction     Thoracic PA mobs     PNF for strengthening     PNF for agonist/antagonist inhibition          Pt education 10 min Provided biomechanics/ergonomics training to reduce stress across injured/healing structures. Provided Education in post-operative precautions/contraindications. HEP. Return frequency/duration. Therapeutic Exercise and NMR EXR  [x] (97722) Provided verbal/tactile cueing for activities related to strengthening, flexibility, endurance, ROM  for improvements in scapular, scapulothoracic and UE control with self care, reaching, carrying, lifting, house/yardwork, driving/computer work. [x] (37740) Provided verbal/tactile cueing for activities related to improving balance, coordination, kinesthetic sense, posture, motor skill, proprioception  to assist with  scapular, scapulothoracic and UE control with self care, reaching, carrying, lifting, house/yardwork, driving/computer work.     Home Exercise Program:    [x] (28133) Reviewed/Progressed HEP activities related to strengthening, flexibility, endurance, ROM of scapular, scapulothoracic and UE control with self care, reaching, carrying, lifting, house/yardwork, driving/computer work  [x] (15314) Reviewed/Progressed HEP activities related to improving balance, coordination, kinesthetic sense, posture, motor skill, proprioception of scapular, scapulothoracic and UE control with self care, reaching, carrying, lifting, house/yardwork, driving/computer work      Manual Treatments:  PROM / STM / Oscillations-Mobs:  G-I, II, III, IV (PA's, Inf., Post.)  [x] (61890) Provided manual therapy to mobilize soft tissue/joints of cervical/CT, scapular GHJ and UE for the purpose of modulating pain, promoting relaxation,  increasing ROM, reducing/eliminating soft tissue swelling/inflammation/restriction, improving soft tissue extensibility and allowing for proper ROM for normal function with self care, reaching, carrying, lifting, house/yardwork, driving/computer work    Modalities:  V pulse 10 min  Charges:  Timed Code Treatment Minutes: 76'   Total Treatment Minutes:    Start Time (BWC)  Stop Time (BWC)  Procedures (BWC) 78'             [] EVAL (LOW) 68768 (typically 20 minutes face-to-face)  [] EVAL (MOD) 70144 (typically 30 minutes face-to-face)  [] EVAL (HIGH) 23460 (typically 45 minutes face-to-face)  [] RE-EVAL     [x] US(71158) x  2   [] Ionto  [x] NMR (05550) x  1   [] ES (unattended)  [x] Manual ([id number]) x  1    [] Mech Traction  [] TA: x (87374)         [] Other:      GOALS:  Therapist goals for Patient:   Short Term Goals: To be achieved in: 2 weeks  1. Independent in HEP and progression per patient tolerance, in order to prevent re-injury. 2. Patient will have a decrease in pain to facilitate improvement in movement, function, and ADLs as indicated by Functional Deficits.     Long Term Goals: To be achieved in: 12 weeks  1. Disability index score of 10% or less for the Quick Dash to assist with reaching prior level of function. 2. Patient will demonstrate increased AROM to equal the opposite side bilaterally to allow for proper joint functioning as indicated by patients Functional Deficits. 3. Patient will demonstrate an increase in strength to UE MMT scores to match bilaterally and allow for proper functional mobility as indicated by patients Functional Deficits. 4. Patient will return to all transfers, work activities, and functional activities without increased symptoms or restriction. 5. Patient will have 0/10 pain with ADL's.  6. Patient stated goal: \"to be able to raise my arm without pain\"     Goals that are underlined signify the goal has been accomplished. Progression Towards Functional goals:  [] Patient is progressing as expected towards functional goals listed. [x] Progression is slowed due to complexities listed. [] Progression has been slowed due to co-morbidities.   [] Plan just implemented, too soon to assess goals progression  [] Other:     ASSESSMENT:  Patient requires max cue to relax and needs reminded to not actively move the arm on numerous occasions.     Treatment/Activity Tolerance:   [x] Patient tolerated treatment well [] Patient limited by fatique  [] Patient limited by pain  [] Patient limited by other medical complications  [] Other:     Prognosis: [] Good [] Fair  [] Poor    Patient Requires Follow-up: [x] Yes  [] No    PLAN: See eval  [x] Continue per plan of care [] Alter current plan (see comments)  [] Plan of care initiated [] Hold pending MD visit [] Discharge    Electronically signed by: Francisco Venegas PTA

## 2019-07-05 ENCOUNTER — HOSPITAL ENCOUNTER (OUTPATIENT)
Dept: MAMMOGRAPHY | Age: 64
Discharge: HOME | End: 2019-07-10
Payer: MEDICARE

## 2019-07-05 DIAGNOSIS — Z12.31 VISIT FOR SCREENING MAMMOGRAM: ICD-10-CM

## 2019-07-05 PROCEDURE — 77067 SCR MAMMO BI INCL CAD: CPT

## 2019-07-18 ENCOUNTER — TELEPHONE (OUTPATIENT)
Dept: WOMENS IMAGING | Age: 64
End: 2019-07-18

## 2019-08-08 ENCOUNTER — HOSPITAL ENCOUNTER (OUTPATIENT)
Dept: ULTRASOUND IMAGING | Age: 64
Discharge: HOME | End: 2019-08-08
Payer: MEDICARE

## 2019-08-08 ENCOUNTER — HOSPITAL ENCOUNTER (OUTPATIENT)
Dept: MAMMOGRAPHY | Age: 64
Discharge: HOME | End: 2019-08-08
Payer: MEDICARE

## 2019-08-08 DIAGNOSIS — R92.8 ABNORMAL MAMMOGRAM: ICD-10-CM

## 2019-08-08 PROCEDURE — G0279 TOMOSYNTHESIS, MAMMO: HCPCS

## 2019-08-27 ENCOUNTER — OFFICE VISIT (OUTPATIENT)
Dept: ORTHOPEDIC SURGERY | Age: 64
End: 2019-08-27
Payer: MEDICARE

## 2019-08-27 VITALS — WEIGHT: 235.01 LBS | HEIGHT: 63 IN | BODY MASS INDEX: 41.64 KG/M2

## 2019-08-27 DIAGNOSIS — Z98.890 S/P LEFT ROTATOR CUFF REPAIR: Primary | ICD-10-CM

## 2019-08-27 DIAGNOSIS — Z98.890 STATUS POST DECOMPRESSION OF SUBACROMIAL SPACE: ICD-10-CM

## 2019-08-27 DIAGNOSIS — Z98.890 S/P CARPAL TUNNEL RELEASE: ICD-10-CM

## 2019-08-27 PROCEDURE — G8417 CALC BMI ABV UP PARAM F/U: HCPCS | Performed by: ORTHOPAEDIC SURGERY

## 2019-08-27 PROCEDURE — 99213 OFFICE O/P EST LOW 20 MIN: CPT | Performed by: ORTHOPAEDIC SURGERY

## 2019-08-27 PROCEDURE — G8427 DOCREV CUR MEDS BY ELIG CLIN: HCPCS | Performed by: ORTHOPAEDIC SURGERY

## 2019-08-27 PROCEDURE — G8400 PT W/DXA NO RESULTS DOC: HCPCS | Performed by: ORTHOPAEDIC SURGERY

## 2019-08-27 PROCEDURE — 4040F PNEUMOC VAC/ADMIN/RCVD: CPT | Performed by: ORTHOPAEDIC SURGERY

## 2019-08-27 PROCEDURE — 1123F ACP DISCUSS/DSCN MKR DOCD: CPT | Performed by: ORTHOPAEDIC SURGERY

## 2019-08-27 PROCEDURE — 3017F COLORECTAL CA SCREEN DOC REV: CPT | Performed by: ORTHOPAEDIC SURGERY

## 2019-08-27 PROCEDURE — 1090F PRES/ABSN URINE INCON ASSESS: CPT | Performed by: ORTHOPAEDIC SURGERY

## 2019-08-27 PROCEDURE — 1036F TOBACCO NON-USER: CPT | Performed by: ORTHOPAEDIC SURGERY

## 2019-08-27 NOTE — PROGRESS NOTES
HISTORY OF PRESENT ILLNESS: The patient returns today  left shoulder video arthroscopy with rotator cuff repair, subacromial decompression and biceps tenotomy as well as left carpal tunnel release . She has 0 out of 10 pain, she continues to have difficulty reaching her arm overhead. She is almost done with physical therapy but think she could benefit from a few more visits. Continues to have mild numbness and tingling down the left hand. DOS: May 8, 2019    PHYSICAL EXAMINATION: Incisions are well-healed at the shoulder and hand. Gross sensation is intact in the median, ulnar and radial nerve distribution. Patient has full range of motion of the hand and wrist.  She has active forward flexion approximately 90 degrees and has 4- out of 5 strength with supraspinatus testing. 5 out of 5 strength with bicep flexion. Passively she has forward flexion approximately 110 degrees. ASSESSMENT/PLAN: Progressing slowly after left shoulder video arthroscopy with rotator cuff repair, subacromial decompression and biceps tenotomy as well as left carpal tunnel release . Patient will continue physical therapy, new prescription was provided. Slowly increase overhead activity and strengthening exercises as able. Follow-up in 2 months. Patient agrees with this plan, all of their questions were answered best of our ability and to their satisfaction. Diagnosis Orders   1. S/P left rotator cuff repair  External Referral To Physical Therapy   2. Status post decompression of subacromial space  External Referral To Physical Therapy   3.  S/P carpal tunnel release  External Referral To Physical Therapy       Virginia Funes

## 2019-09-12 ENCOUNTER — OFFICE VISIT (OUTPATIENT)
Dept: PULMONOLOGY | Age: 64
End: 2019-09-12
Payer: MEDICARE

## 2019-09-12 VITALS
RESPIRATION RATE: 16 BRPM | BODY MASS INDEX: 43.02 KG/M2 | WEIGHT: 242.8 LBS | HEIGHT: 63 IN | HEART RATE: 79 BPM | OXYGEN SATURATION: 96 % | SYSTOLIC BLOOD PRESSURE: 121 MMHG | TEMPERATURE: 97.3 F | DIASTOLIC BLOOD PRESSURE: 64 MMHG

## 2019-09-12 DIAGNOSIS — D86.0 PULMONARY SARCOIDOSIS (HCC): Primary | ICD-10-CM

## 2019-09-12 DIAGNOSIS — R06.02 SHORTNESS OF BREATH: ICD-10-CM

## 2019-09-12 PROCEDURE — 3017F COLORECTAL CA SCREEN DOC REV: CPT | Performed by: INTERNAL MEDICINE

## 2019-09-12 PROCEDURE — G8417 CALC BMI ABV UP PARAM F/U: HCPCS | Performed by: INTERNAL MEDICINE

## 2019-09-12 PROCEDURE — G8427 DOCREV CUR MEDS BY ELIG CLIN: HCPCS | Performed by: INTERNAL MEDICINE

## 2019-09-12 PROCEDURE — 99203 OFFICE O/P NEW LOW 30 MIN: CPT | Performed by: INTERNAL MEDICINE

## 2019-09-12 PROCEDURE — 1090F PRES/ABSN URINE INCON ASSESS: CPT | Performed by: INTERNAL MEDICINE

## 2019-09-12 PROCEDURE — 4040F PNEUMOC VAC/ADMIN/RCVD: CPT | Performed by: INTERNAL MEDICINE

## 2019-09-12 PROCEDURE — 1123F ACP DISCUSS/DSCN MKR DOCD: CPT | Performed by: INTERNAL MEDICINE

## 2019-09-12 PROCEDURE — 1036F TOBACCO NON-USER: CPT | Performed by: INTERNAL MEDICINE

## 2019-09-12 PROCEDURE — G8400 PT W/DXA NO RESULTS DOC: HCPCS | Performed by: INTERNAL MEDICINE

## 2019-09-12 RX ORDER — ALBUTEROL SULFATE 90 UG/1
2 AEROSOL, METERED RESPIRATORY (INHALATION) EVERY 4 HOURS PRN
Qty: 1 INHALER | Refills: 5 | Status: SHIPPED | OUTPATIENT
Start: 2019-09-12 | End: 2020-09-11

## 2019-09-12 RX ORDER — PNV NO.95/FERROUS FUM/FOLIC AC 28MG-0.8MG
65 TABLET ORAL
Refills: 4 | COMMUNITY
Start: 2019-07-30

## 2019-09-12 NOTE — PROGRESS NOTES
CC: Here for sick visit  HPI I  shortness of breath in chest, moderate: worsened over last year. Concerned about sarcoid. Minimal associated cough. Notices that albuterol inhaler, helps significantly short term     Patient is allergic to plaquenil [hydroxychloroquine sulfate] and demerol.     Past Medical History:   Diagnosis Date    Anemia     Arthritis     Bronchiectasis ([address]     Diabetes mellitus (HCC)     Hypertension     Lung disease     Mediastinal lymphadenopathy     PONV (postoperative nausea and vomiting)     Sarcoidosis        Past Surgical History:   Procedure Laterality Date    APPENDECTOMY      CARPAL TUNNEL RELEASE      CATARACT REMOVAL WITH IMPLANT Right 04/03/2019    EYE SURGERY Left 04/24/2019    PHACO EMULSIFICATION OF CATARACT WITH INTRAOCULAR LENS IMPLANT EYE     INTRACAPSULAR CATARACT EXTRACTION Right 4/3/2019    PHACO EMULSIFICATION OF CATARACT WITH INTRAOCULAR LENS IMPLANT EYE performed by Sadia Seaman MD at [address]  Left 4/24/2019    PHACO EMULSIFICATION OF CATARACT WITH INTRAOCULAR LENS IMPLANT EYE performed by Sadia Seaman MD at [address] [facility] OTHER SURGICAL HISTORY  04/16/2018    right total knee replacement    TN NJX DX/THER SBST INTRLMNR CRV/THRC W/IMG GDN Left 8/24/2018    LEFT LUMBAR FIVE SACRAL ONE EPIDURAL STEROID INJECTION SITE CONFIRMED BY FLUOROSCOPY performed by Elba Dawson MD at [address] Left 05/08/2019    Left Carpal Tunnel Release    SHOULDER ARTHROSCOPY Left 5/8/2019    LEFT SHOULDER VIDEO ARTHROSCOPY, ROTATOR CUFF REPAIR, SUBACROMIAL DECOMPRESSION, BICEPS TENOTOMY, LEFT CARPAL TUNNEL RELEASE performed by Verdel Koyanagi, DO at [address]   Allergen Reactions    Plaquenil [Hydroxychloroquine Sulfate] Shortness Of Breath    Demerol Other (See Comments)     HALLUCINATE       Medication list was reviewed and updated as needed in Epic     reports that she has never smoked. She has never used smokeless tobacco.    family history includes Cancer in her brother and mother. Review of Systems  REVIEW OF SYSTEMS: no fevers; no dry eyes; no hearing deficit; no hoarseness; no hemoptysis, hematemesis, hematochezia or hematuria; no chest pain; + dyspnea; no diabetes or elevated sugars; chronic back pain; no syncope or seizure; no rash; no easy bruising or bleeding     Objective:   Physical Exam  Blood pressure 121/64, pulse 79, temperature 97.3 °F (36.3 °C), temperature source Oral, resp. rate 16, height 5' 3\" (1.6 m), weight 242 lb 12.8 oz (110.1 kg), last menstrual period 05/05/2006, SpO2 96 %. RA  Constitutional:  No acute distress. HENT:  Oropharynx is clear and moist.   Eyes: Pupils equal, round, and reactive to light. No scleral icterus. Neck: No tracheal deviation present. Cardiovascular: Normal heart sounds. ++ lower extremity edema. Pulmonary/Chest: No wheezes. No rhonchi. No rales. No decreased breath sounds. No accessory muscle usage or stridor. Musculoskeletal: No cyanosis. No clubbing. Skin: Skin is warm and dry. Psychiatric: Normal mood and affect. PFT Dec 2009  FEV1 2.62 L 116% TLC 5.17 L 108% DLCO 23.91 125%   [facility]  PFT NOv 2010 FEV12.18 L 97% TLC 4.88 L 103% DLCO 16.36 86%  PFT May 2011  FEV1 is 2.62 L, 117% TLC 5.41 L 114%        DLCO 22.39  118%   PFT Aug 2012  FEV1 2.57 L 102% TLC 5.32 L 105% DLCO 21.38 94%    CT CHEST 12-7-15  Mediastinum: The heart is enlarged. Aorta and pulmonary arteries are normal.  Calcified left hilar lymph nodes. No additional lymph node enlargement. Lungs/pleura: Airways are patent. No pleural fluid is present. Multiple tiny  noncalcified pulmonary nodules are present bilaterally. Largest individual  nodule in the right middle lobe adjacent to the minor fissure measures 5 mm. This is stable from remote CT in 2012.  Several additional

## 2019-09-16 ENCOUNTER — HOSPITAL ENCOUNTER (OUTPATIENT)
Dept: GENERAL RADIOLOGY | Age: 64
Discharge: HOME | End: 2019-09-16
Payer: MEDICARE

## 2019-09-16 ENCOUNTER — HOSPITAL ENCOUNTER (OUTPATIENT)
Age: 64
Discharge: HOME | End: 2019-09-16
Payer: MEDICARE

## 2019-09-16 DIAGNOSIS — D86.0 PULMONARY SARCOIDOSIS (HCC): ICD-10-CM

## 2019-09-16 PROCEDURE — 71046 X-RAY EXAM CHEST 2 VIEWS: CPT

## 2019-09-17 ENCOUNTER — HOSPITAL ENCOUNTER (OUTPATIENT)
Dept: PULMONOLOGY | Age: 64
Discharge: HOME | End: 2019-09-17
Payer: MEDICARE

## 2019-09-17 VITALS — OXYGEN SATURATION: 98 %

## 2019-09-17 DIAGNOSIS — D86.0 PULMONARY SARCOIDOSIS (HCC): ICD-10-CM

## 2019-09-17 LAB
DLCO %PRED: 73 %
DLCO PRED: NORMAL ML/MIN/MMHG
DLCO/VA %PRED: NORMAL %
DLCO/VA PRED: NORMAL ML/MIN/MMHG
DLCO/VA: NORMAL ML/MIN/MMHG
DLCO: NORMAL ML/MIN/MMHG
EXPIRATORY TIME-POST: NORMAL SEC
EXPIRATORY TIME: NORMAL SEC
FEF 25-75% %CHNG: NORMAL
FEF 25-75% %PRED-POST: NORMAL %
FEF 25-75% %PRED-PRE: NORMAL L/SEC
FEF 25-75% PRED: NORMAL L/SEC
FEF 25-75%-POST: NORMAL L/SEC
FEF 25-75%-PRE: NORMAL L/SEC
FEV1 %PRED-POST: 96 %
FEV1 %PRED-PRE: 98 %
FEV1 PRED: NORMAL L
FEV1-POST: NORMAL L
FEV1-PRE: NORMAL L
FEV1/FVC %PRED-POST: NORMAL %
FEV1/FVC %PRED-PRE: NORMAL %
FEV1/FVC PRED: NORMAL %
FEV1/FVC-POST: 78 %
FEV1/FVC-PRE: 76 %
FVC %PRED-POST: NORMAL L
FVC %PRED-PRE: NORMAL %
FVC PRED: NORMAL L
FVC-POST: NORMAL L
FVC-PRE: NORMAL L
GAW %PRED: NORMAL %
GAW PRED: NORMAL L/S/CMH2O
GAW: NORMAL L/S/CMH2O
IC %PRED: NORMAL %
IC PRED: 88 L
IC: NORMAL L
MEP: NORMAL
MIP: NORMAL
MVV %PRED-PRE: NORMAL %
MVV PRED: NORMAL L/MIN
MVV-PRE: NORMAL L/MIN
PEF %PRED-POST: NORMAL %
PEF %PRED-PRE: NORMAL L/SEC
PEF PRED: NORMAL L/SEC
PEF%CHNG: NORMAL
PEF-POST: NORMAL L/SEC
PEF-PRE: NORMAL L/SEC
RAW %PRED: NORMAL %
RAW PRED: NORMAL CMH2O/L/S
RAW: NORMAL CMH2O/L/S
RV %PRED: NORMAL %
RV PRED: NORMAL L
RV: NORMAL L
SVC %PRED: NORMAL %
SVC PRED: NORMAL L
SVC: NORMAL L
TLC %PRED: 88 %
TLC PRED: NORMAL L
TLC: NORMAL L
VA %PRED: NORMAL %
VA PRED: NORMAL L
VA: NORMAL L
VTG %PRED: NORMAL %
VTG PRED: NORMAL L
VTG: NORMAL L

## 2019-09-17 PROCEDURE — 6360000002 HC RX W HCPCS: Performed by: INTERNAL MEDICINE

## 2019-09-17 PROCEDURE — 94640 AIRWAY INHALATION TREATMENT: CPT

## 2019-09-17 PROCEDURE — 94729 DIFFUSING CAPACITY: CPT

## 2019-09-17 PROCEDURE — 94726 PLETHYSMOGRAPHY LUNG VOLUMES: CPT

## 2019-09-17 PROCEDURE — 94060 EVALUATION OF WHEEZING: CPT

## 2019-09-17 PROCEDURE — 94760 N-INVAS EAR/PLS OXIMETRY 1: CPT

## 2019-09-17 RX ORDER — ALBUTEROL SULFATE 2.5 MG/3ML
2.5 SOLUTION RESPIRATORY (INHALATION) ONCE
Status: COMPLETED | OUTPATIENT
Start: 2019-09-17 | End: 2019-09-17

## 2019-09-17 RX ADMIN — ALBUTEROL SULFATE 2.5 MG: 2.5 SOLUTION RESPIRATORY (INHALATION) at 11:05

## 2019-09-17 ASSESSMENT — PULMONARY FUNCTION TESTS
FEV1/FVC_POST: 78
FEV1_PERCENT_PREDICTED_POST: 96
FEV1_PERCENT_PREDICTED_PRE: 98
FEV1/FVC_PRE: 76

## 2019-09-26 ENCOUNTER — TELEPHONE (OUTPATIENT)
Dept: PULMONOLOGY | Age: 64
End: 2019-09-26

## 2019-09-26 ENCOUNTER — OFFICE VISIT (OUTPATIENT)
Dept: PULMONOLOGY | Age: 64
End: 2019-09-26
Payer: MEDICARE

## 2019-09-26 VITALS
OXYGEN SATURATION: 100 % | WEIGHT: 241.4 LBS | DIASTOLIC BLOOD PRESSURE: 72 MMHG | RESPIRATION RATE: 18 BRPM | SYSTOLIC BLOOD PRESSURE: 110 MMHG | HEART RATE: 68 BPM | BODY MASS INDEX: 41.21 KG/M2 | HEIGHT: 64 IN

## 2019-09-26 DIAGNOSIS — D86.0 PULMONARY SARCOIDOSIS (HCC): Primary | ICD-10-CM

## 2019-09-26 PROCEDURE — 4040F PNEUMOC VAC/ADMIN/RCVD: CPT | Performed by: INTERNAL MEDICINE

## 2019-09-26 PROCEDURE — 99214 OFFICE O/P EST MOD 30 MIN: CPT | Performed by: INTERNAL MEDICINE

## 2019-09-26 PROCEDURE — G8417 CALC BMI ABV UP PARAM F/U: HCPCS | Performed by: INTERNAL MEDICINE

## 2019-09-26 PROCEDURE — 1123F ACP DISCUSS/DSCN MKR DOCD: CPT | Performed by: INTERNAL MEDICINE

## 2019-09-26 PROCEDURE — G8400 PT W/DXA NO RESULTS DOC: HCPCS | Performed by: INTERNAL MEDICINE

## 2019-09-26 PROCEDURE — G8427 DOCREV CUR MEDS BY ELIG CLIN: HCPCS | Performed by: INTERNAL MEDICINE

## 2019-09-26 PROCEDURE — 1090F PRES/ABSN URINE INCON ASSESS: CPT | Performed by: INTERNAL MEDICINE

## 2019-09-26 PROCEDURE — 3017F COLORECTAL CA SCREEN DOC REV: CPT | Performed by: INTERNAL MEDICINE

## 2019-09-26 PROCEDURE — 1036F TOBACCO NON-USER: CPT | Performed by: INTERNAL MEDICINE

## 2019-09-26 NOTE — PROGRESS NOTES
CC: sarcoid  HPI I  shortness of breath in chest, moderate: worsened over last year. Concerned about sarcoid. Minimal associated cough. Notices that albuterol inhaler only helps minimally. Did PFT  Medication list was reviewed and updated as needed in Epic     reports that she has never smoked. She has never used smokeless tobacco.    Objective:   Physical Exam  Blood pressure 110/72, pulse 68, resp. rate 18, height 5' 3.5\" (1.613 m), weight 241 lb 6.4 oz (109.5 kg), last menstrual period 05/05/2006, SpO2 100 %. RA  Constitutional:  No acute distress. HENT:  Oropharynx is clear and moist.   Eyes: Pupils equal, round, and reactive to light. No scleral icterus. Neck: No tracheal deviation present. Cardiovascular: Normal heart sounds. ++ lower extremity edema. Pulmonary/Chest: No wheezes. No rhonchi. No rales. No decreased breath sounds. No accessory muscle usage or stridor. Musculoskeletal: No cyanosis. No clubbing. Skin: Skin is warm and dry. Psychiatric: Normal mood and affect. PFT Dec 2009  FEV1 2.62 L 116% TLC 5.17 L 108% DLCO 23.91 125%   [facility]  PFT NOv 2010 FEV12.18 L 97% TLC 4.88 L 103% DLCO 16.36 86%  PFT May 2011  FEV1 is 2.62 L, 117% TLC 5.41 L 114%        DLCO 22.39  118%   PFT Aug 2012  FEV1 2.57 L 102% TLC 5.32 L 105% DLCO 21.38 94%  PFT 9/17/19   FEV1 2.28 L 98% TLC 4.36 L 88% DLCO 16.05 73%     CT CHEST 12-7-15  Mediastinum: The heart is enlarged. Aorta and pulmonary arteries are normal.  Calcified left hilar lymph nodes. No additional lymph node enlargement. Lungs/pleura: Airways are patent. No pleural fluid is present. Multiple tiny  noncalcified pulmonary nodules are present bilaterally. Largest individual  nodule in the right middle lobe adjacent to the minor fissure measures 5 mm. This is stable from remote CT in 2012. Several additional tiny nodules are  stable from that prior CT PICC, and [place] nodules seen at that time are no  longer present.  No new or enlarging

## 2019-10-11 ENCOUNTER — HOSPITAL ENCOUNTER (OUTPATIENT)
Dept: CT IMAGING | Age: 64
Discharge: HOME | End: 2019-10-11
Payer: MEDICARE

## 2019-10-11 DIAGNOSIS — D86.0 PULMONARY SARCOIDOSIS (HCC): ICD-10-CM

## 2019-10-11 PROCEDURE — 71250 CT THORAX DX C-: CPT

## 2019-10-29 ENCOUNTER — OFFICE VISIT (OUTPATIENT)
Dept: ORTHOPEDIC SURGERY | Age: 64
End: 2019-10-29
Payer: MEDICARE

## 2019-10-29 VITALS — HEIGHT: 64 IN | BODY MASS INDEX: 41.21 KG/M2 | WEIGHT: 241.4 LBS

## 2019-10-29 DIAGNOSIS — Z98.890 S/P LEFT ROTATOR CUFF REPAIR: Primary | ICD-10-CM

## 2019-10-29 DIAGNOSIS — Z98.890 S/P CARPAL TUNNEL RELEASE: ICD-10-CM

## 2019-10-29 PROCEDURE — G8427 DOCREV CUR MEDS BY ELIG CLIN: HCPCS | Performed by: ORTHOPAEDIC SURGERY

## 2019-10-29 PROCEDURE — G8400 PT W/DXA NO RESULTS DOC: HCPCS | Performed by: ORTHOPAEDIC SURGERY

## 2019-10-29 PROCEDURE — 99213 OFFICE O/P EST LOW 20 MIN: CPT | Performed by: ORTHOPAEDIC SURGERY

## 2019-10-29 PROCEDURE — 4040F PNEUMOC VAC/ADMIN/RCVD: CPT | Performed by: ORTHOPAEDIC SURGERY

## 2019-10-29 PROCEDURE — 1090F PRES/ABSN URINE INCON ASSESS: CPT | Performed by: ORTHOPAEDIC SURGERY

## 2019-10-29 PROCEDURE — G8417 CALC BMI ABV UP PARAM F/U: HCPCS | Performed by: ORTHOPAEDIC SURGERY

## 2019-10-29 PROCEDURE — 1123F ACP DISCUSS/DSCN MKR DOCD: CPT | Performed by: ORTHOPAEDIC SURGERY

## 2019-10-29 PROCEDURE — G8484 FLU IMMUNIZE NO ADMIN: HCPCS | Performed by: ORTHOPAEDIC SURGERY

## 2019-10-29 PROCEDURE — 1036F TOBACCO NON-USER: CPT | Performed by: ORTHOPAEDIC SURGERY

## 2019-10-29 PROCEDURE — 3017F COLORECTAL CA SCREEN DOC REV: CPT | Performed by: ORTHOPAEDIC SURGERY

## 2019-11-14 ENCOUNTER — TELEPHONE (OUTPATIENT)
Dept: SURGERY | Age: 64
End: 2019-11-14

## 2019-11-20 ENCOUNTER — OFFICE VISIT (OUTPATIENT)
Dept: ORTHOPEDIC SURGERY | Age: 64
End: 2019-11-20
Payer: MEDICARE

## 2019-11-20 ENCOUNTER — TELEPHONE (OUTPATIENT)
Dept: ORTHOPEDIC SURGERY | Age: 64
End: 2019-11-20

## 2019-11-20 VITALS — HEIGHT: 64 IN | BODY MASS INDEX: 41.21 KG/M2 | WEIGHT: 241.4 LBS

## 2019-11-20 DIAGNOSIS — R22.32 FOREARM MASS, LEFT: Primary | ICD-10-CM

## 2019-11-20 PROCEDURE — 1090F PRES/ABSN URINE INCON ASSESS: CPT | Performed by: ORTHOPAEDIC SURGERY

## 2019-11-20 PROCEDURE — 1123F ACP DISCUSS/DSCN MKR DOCD: CPT | Performed by: ORTHOPAEDIC SURGERY

## 2019-11-20 PROCEDURE — G8417 CALC BMI ABV UP PARAM F/U: HCPCS | Performed by: ORTHOPAEDIC SURGERY

## 2019-11-20 PROCEDURE — 4040F PNEUMOC VAC/ADMIN/RCVD: CPT | Performed by: ORTHOPAEDIC SURGERY

## 2019-11-20 PROCEDURE — 99213 OFFICE O/P EST LOW 20 MIN: CPT | Performed by: ORTHOPAEDIC SURGERY

## 2019-11-20 PROCEDURE — G8484 FLU IMMUNIZE NO ADMIN: HCPCS | Performed by: ORTHOPAEDIC SURGERY

## 2019-11-20 PROCEDURE — G8427 DOCREV CUR MEDS BY ELIG CLIN: HCPCS | Performed by: ORTHOPAEDIC SURGERY

## 2019-11-20 PROCEDURE — 3017F COLORECTAL CA SCREEN DOC REV: CPT | Performed by: ORTHOPAEDIC SURGERY

## 2019-11-20 PROCEDURE — G8400 PT W/DXA NO RESULTS DOC: HCPCS | Performed by: ORTHOPAEDIC SURGERY

## 2019-11-20 PROCEDURE — 1036F TOBACCO NON-USER: CPT | Performed by: ORTHOPAEDIC SURGERY

## 2019-11-26 ENCOUNTER — ANESTHESIA EVENT (OUTPATIENT)
Dept: OPERATING ROOM | Age: 64
End: 2019-11-26
Payer: MEDICARE

## 2019-12-02 ENCOUNTER — ANESTHESIA (OUTPATIENT)
Dept: OPERATING ROOM | Age: 64
End: 2019-12-02
Payer: MEDICARE

## 2019-12-02 ENCOUNTER — HOSPITAL ENCOUNTER (OUTPATIENT)
Age: 64
Setting detail: OUTPATIENT SURGERY
Discharge: HOME | End: 2019-12-02
Attending: ORTHOPAEDIC SURGERY | Admitting: ORTHOPAEDIC SURGERY
Payer: MEDICARE

## 2019-12-02 VITALS
RESPIRATION RATE: 9 BRPM | SYSTOLIC BLOOD PRESSURE: 87 MMHG | OXYGEN SATURATION: 88 % | DIASTOLIC BLOOD PRESSURE: 49 MMHG

## 2019-12-02 VITALS
OXYGEN SATURATION: 96 % | HEIGHT: 63 IN | WEIGHT: 230 LBS | DIASTOLIC BLOOD PRESSURE: 90 MMHG | HEART RATE: 65 BPM | TEMPERATURE: 98 F | SYSTOLIC BLOOD PRESSURE: 145 MMHG | BODY MASS INDEX: 40.75 KG/M2 | RESPIRATION RATE: 16 BRPM

## 2019-12-02 DIAGNOSIS — R22.32 FOREARM MASS, LEFT: Primary | ICD-10-CM

## 2019-12-02 LAB
GLUCOSE BLD-MCNC: 114 MG/DL (ref 70–99)
GLUCOSE BLD-MCNC: 87 MG/DL (ref 70–99)
PERFORMED ON: ABNORMAL
PERFORMED ON: NORMAL

## 2019-12-02 PROCEDURE — 3600000002 HC SURGERY LEVEL 2 BASE: Performed by: ORTHOPAEDIC SURGERY

## 2019-12-02 PROCEDURE — 87077 CULTURE AEROBIC IDENTIFY: CPT

## 2019-12-02 PROCEDURE — 3700000001 HC ADD 15 MINUTES (ANESTHESIA): Performed by: ORTHOPAEDIC SURGERY

## 2019-12-02 PROCEDURE — 7100000001 HC PACU RECOVERY - ADDTL 15 MIN: Performed by: ORTHOPAEDIC SURGERY

## 2019-12-02 PROCEDURE — 6360000002 HC RX W HCPCS: Performed by: ANESTHESIOLOGY

## 2019-12-02 PROCEDURE — 6360000002 HC RX W HCPCS: Performed by: NURSE ANESTHETIST, CERTIFIED REGISTERED

## 2019-12-02 PROCEDURE — 2500000003 HC RX 250 WO HCPCS: Performed by: NURSE ANESTHETIST, CERTIFIED REGISTERED

## 2019-12-02 PROCEDURE — 2500000003 HC RX 250 WO HCPCS: Performed by: ORTHOPAEDIC SURGERY

## 2019-12-02 PROCEDURE — 3700000000 HC ANESTHESIA ATTENDED CARE: Performed by: ORTHOPAEDIC SURGERY

## 2019-12-02 PROCEDURE — 2709999900 HC NON-CHARGEABLE SUPPLY: Performed by: ORTHOPAEDIC SURGERY

## 2019-12-02 PROCEDURE — 2580000003 HC RX 258: Performed by: ANESTHESIOLOGY

## 2019-12-02 PROCEDURE — 87186 SC STD MICRODIL/AGAR DIL: CPT

## 2019-12-02 PROCEDURE — 87070 CULTURE OTHR SPECIMN AEROBIC: CPT

## 2019-12-02 PROCEDURE — 87205 SMEAR GRAM STAIN: CPT

## 2019-12-02 PROCEDURE — 7100000010 HC PHASE II RECOVERY - FIRST 15 MIN: Performed by: ORTHOPAEDIC SURGERY

## 2019-12-02 PROCEDURE — 7100000011 HC PHASE II RECOVERY - ADDTL 15 MIN: Performed by: ORTHOPAEDIC SURGERY

## 2019-12-02 PROCEDURE — 87075 CULTR BACTERIA EXCEPT BLOOD: CPT

## 2019-12-02 PROCEDURE — 3600000012 HC SURGERY LEVEL 2 ADDTL 15MIN: Performed by: ORTHOPAEDIC SURGERY

## 2019-12-02 PROCEDURE — 2500000003 HC RX 250 WO HCPCS

## 2019-12-02 PROCEDURE — 6360000002 HC RX W HCPCS: Performed by: ORTHOPAEDIC SURGERY

## 2019-12-02 PROCEDURE — 88307 TISSUE EXAM BY PATHOLOGIST: CPT

## 2019-12-02 PROCEDURE — 7100000000 HC PACU RECOVERY - FIRST 15 MIN: Performed by: ORTHOPAEDIC SURGERY

## 2019-12-02 RX ORDER — LABETALOL HYDROCHLORIDE 5 MG/ML
5 INJECTION, SOLUTION INTRAVENOUS EVERY 10 MIN PRN
Status: DISCONTINUED | OUTPATIENT
Start: 2019-12-02 | End: 2019-12-02 | Stop reason: HOSPADM

## 2019-12-02 RX ORDER — BUPIVACAINE HYDROCHLORIDE 2.5 MG/ML
INJECTION, SOLUTION EPIDURAL; INFILTRATION; INTRACAUDAL
Status: DISCONTINUED
Start: 2019-12-02 | End: 2019-12-02 | Stop reason: HOSPADM

## 2019-12-02 RX ORDER — ONDANSETRON 2 MG/ML
4 INJECTION INTRAMUSCULAR; INTRAVENOUS EVERY 10 MIN PRN
Status: DISCONTINUED | OUTPATIENT
Start: 2019-12-02 | End: 2019-12-02 | Stop reason: HOSPADM

## 2019-12-02 RX ORDER — LIDOCAINE HYDROCHLORIDE 10 MG/ML
INJECTION, SOLUTION EPIDURAL; INFILTRATION; INTRACAUDAL; PERINEURAL
Status: COMPLETED
Start: 2019-12-02 | End: 2019-12-02

## 2019-12-02 RX ORDER — PROPOFOL 10 MG/ML
INJECTION, EMULSION INTRAVENOUS PRN
Status: DISCONTINUED | OUTPATIENT
Start: 2019-12-02 | End: 2019-12-02 | Stop reason: SDUPTHER

## 2019-12-02 RX ORDER — LIDOCAINE HYDROCHLORIDE 20 MG/ML
INJECTION, SOLUTION EPIDURAL; INFILTRATION; INTRACAUDAL; PERINEURAL PRN
Status: DISCONTINUED | OUTPATIENT
Start: 2019-12-02 | End: 2019-12-02 | Stop reason: SDUPTHER

## 2019-12-02 RX ORDER — SODIUM CHLORIDE 0.9 % (FLUSH) 0.9 %
10 SYRINGE (ML) INJECTION PRN
Status: DISCONTINUED | OUTPATIENT
Start: 2019-12-02 | End: 2019-12-02 | Stop reason: HOSPADM

## 2019-12-02 RX ORDER — FENTANYL CITRATE 50 UG/ML
INJECTION, SOLUTION INTRAMUSCULAR; INTRAVENOUS PRN
Status: DISCONTINUED | OUTPATIENT
Start: 2019-12-02 | End: 2019-12-02 | Stop reason: SDUPTHER

## 2019-12-02 RX ORDER — BUPIVACAINE HYDROCHLORIDE 2.5 MG/ML
INJECTION, SOLUTION INFILTRATION; PERINEURAL PRN
Status: DISCONTINUED | OUTPATIENT
Start: 2019-12-02 | End: 2019-12-02 | Stop reason: ALTCHOICE

## 2019-12-02 RX ORDER — EPHEDRINE SULFATE 50 MG/ML
INJECTION INTRAVENOUS PRN
Status: DISCONTINUED | OUTPATIENT
Start: 2019-12-02 | End: 2019-12-02 | Stop reason: SDUPTHER

## 2019-12-02 RX ORDER — TRAMADOL HYDROCHLORIDE 50 MG/1
50 TABLET ORAL EVERY 8 HOURS PRN
Qty: 15 TABLET | Refills: 0 | Status: SHIPPED | OUTPATIENT
Start: 2019-12-02 | End: 2019-12-07

## 2019-12-02 RX ORDER — DEXAMETHASONE SODIUM PHOSPHATE 4 MG/ML
INJECTION, SOLUTION INTRA-ARTICULAR; INTRALESIONAL; INTRAMUSCULAR; INTRAVENOUS; SOFT TISSUE PRN
Status: DISCONTINUED | OUTPATIENT
Start: 2019-12-02 | End: 2019-12-02 | Stop reason: SDUPTHER

## 2019-12-02 RX ORDER — SODIUM CHLORIDE 0.9 % (FLUSH) 0.9 %
10 SYRINGE (ML) INJECTION EVERY 12 HOURS SCHEDULED
Status: DISCONTINUED | OUTPATIENT
Start: 2019-12-02 | End: 2019-12-02 | Stop reason: HOSPADM

## 2019-12-02 RX ORDER — ONDANSETRON 2 MG/ML
INJECTION INTRAMUSCULAR; INTRAVENOUS PRN
Status: DISCONTINUED | OUTPATIENT
Start: 2019-12-02 | End: 2019-12-02 | Stop reason: SDUPTHER

## 2019-12-02 RX ORDER — SODIUM CHLORIDE, SODIUM LACTATE, POTASSIUM CHLORIDE, CALCIUM CHLORIDE 600; 310; 30; 20 MG/100ML; MG/100ML; MG/100ML; MG/100ML
INJECTION, SOLUTION INTRAVENOUS CONTINUOUS
Status: DISCONTINUED | OUTPATIENT
Start: 2019-12-02 | End: 2019-12-02 | Stop reason: HOSPADM

## 2019-12-02 RX ORDER — OXYCODONE HYDROCHLORIDE AND ACETAMINOPHEN 5; 325 MG/1; MG/1
1 TABLET ORAL PRN
Status: DISCONTINUED | OUTPATIENT
Start: 2019-12-02 | End: 2019-12-02 | Stop reason: HOSPADM

## 2019-12-02 RX ORDER — HYDRALAZINE HYDROCHLORIDE 20 MG/ML
5 INJECTION INTRAMUSCULAR; INTRAVENOUS EVERY 10 MIN PRN
Status: DISCONTINUED | OUTPATIENT
Start: 2019-12-02 | End: 2019-12-02 | Stop reason: HOSPADM

## 2019-12-02 RX ORDER — OXYCODONE HYDROCHLORIDE AND ACETAMINOPHEN 5; 325 MG/1; MG/1
2 TABLET ORAL PRN
Status: DISCONTINUED | OUTPATIENT
Start: 2019-12-02 | End: 2019-12-02 | Stop reason: HOSPADM

## 2019-12-02 RX ADMIN — ONDANSETRON 4 MG: 2 INJECTION INTRAMUSCULAR; INTRAVENOUS at 12:51

## 2019-12-02 RX ADMIN — DEXAMETHASONE SODIUM PHOSPHATE 10 MG: 4 INJECTION, SOLUTION INTRAMUSCULAR; INTRAVENOUS at 11:33

## 2019-12-02 RX ADMIN — HYDROMORPHONE HYDROCHLORIDE 0.5 MG: 1 INJECTION, SOLUTION INTRAMUSCULAR; INTRAVENOUS; SUBCUTANEOUS at 12:57

## 2019-12-02 RX ADMIN — SODIUM CHLORIDE, POTASSIUM CHLORIDE, SODIUM LACTATE AND CALCIUM CHLORIDE: 600; 310; 30; 20 INJECTION, SOLUTION INTRAVENOUS at 10:49

## 2019-12-02 RX ADMIN — EPHEDRINE SULFATE 10 MG: 50 INJECTION INTRAVENOUS at 11:56

## 2019-12-02 RX ADMIN — FENTANYL CITRATE 50 MCG: 50 INJECTION INTRAMUSCULAR; INTRAVENOUS at 11:33

## 2019-12-02 RX ADMIN — HYDROMORPHONE HYDROCHLORIDE 0.5 MG: 1 INJECTION, SOLUTION INTRAMUSCULAR; INTRAVENOUS; SUBCUTANEOUS at 13:11

## 2019-12-02 RX ADMIN — FENTANYL CITRATE 25 MCG: 50 INJECTION INTRAMUSCULAR; INTRAVENOUS at 11:47

## 2019-12-02 RX ADMIN — PROPOFOL 200 MG: 10 INJECTION, EMULSION INTRAVENOUS at 11:33

## 2019-12-02 RX ADMIN — FENTANYL CITRATE 25 MCG: 50 INJECTION INTRAMUSCULAR; INTRAVENOUS at 12:02

## 2019-12-02 RX ADMIN — LIDOCAINE HYDROCHLORIDE 0.1 ML: 10 INJECTION, SOLUTION EPIDURAL; INFILTRATION; INTRACAUDAL; PERINEURAL at 10:58

## 2019-12-02 RX ADMIN — Medication 1.5 G: at 10:59

## 2019-12-02 RX ADMIN — LIDOCAINE HYDROCHLORIDE 60 MG: 20 INJECTION, SOLUTION EPIDURAL; INFILTRATION; INTRACAUDAL; PERINEURAL at 11:33

## 2019-12-02 RX ADMIN — ONDANSETRON 4 MG: 2 INJECTION, SOLUTION INTRAMUSCULAR; INTRAVENOUS at 11:33

## 2019-12-02 ASSESSMENT — PAIN DESCRIPTION - ORIENTATION
ORIENTATION: LEFT

## 2019-12-02 ASSESSMENT — PULMONARY FUNCTION TESTS
PIF_VALUE: 6
PIF_VALUE: 7
PIF_VALUE: 6
PIF_VALUE: 5
PIF_VALUE: 4
PIF_VALUE: 5
PIF_VALUE: 5
PIF_VALUE: 6
PIF_VALUE: 5
PIF_VALUE: 1
PIF_VALUE: 5
PIF_VALUE: 1
PIF_VALUE: 5
PIF_VALUE: 1
PIF_VALUE: 4
PIF_VALUE: 6
PIF_VALUE: 5
PIF_VALUE: 6
PIF_VALUE: 6
PIF_VALUE: 5
PIF_VALUE: 6
PIF_VALUE: 5
PIF_VALUE: 5
PIF_VALUE: 6
PIF_VALUE: 18
PIF_VALUE: 6
PIF_VALUE: 5
PIF_VALUE: 7
PIF_VALUE: 18
PIF_VALUE: 6
PIF_VALUE: 6
PIF_VALUE: 5
PIF_VALUE: 7
PIF_VALUE: 5
PIF_VALUE: 4
PIF_VALUE: 6
PIF_VALUE: 0
PIF_VALUE: 5
PIF_VALUE: 5
PIF_VALUE: 6
PIF_VALUE: 5
PIF_VALUE: 5
PIF_VALUE: 4
PIF_VALUE: 5
PIF_VALUE: 6
PIF_VALUE: 5
PIF_VALUE: 4
PIF_VALUE: 6
PIF_VALUE: 2
PIF_VALUE: 4
PIF_VALUE: 6
PIF_VALUE: 5
PIF_VALUE: 4
PIF_VALUE: 6
PIF_VALUE: 0
PIF_VALUE: 6
PIF_VALUE: 5
PIF_VALUE: 4
PIF_VALUE: 4
PIF_VALUE: 3
PIF_VALUE: 4
PIF_VALUE: 1
PIF_VALUE: 6
PIF_VALUE: 8
PIF_VALUE: 5
PIF_VALUE: 5

## 2019-12-02 ASSESSMENT — PAIN DESCRIPTION - DESCRIPTORS: DESCRIPTORS: ACHING

## 2019-12-02 ASSESSMENT — PAIN SCALES - GENERAL
PAINLEVEL_OUTOF10: 5
PAINLEVEL_OUTOF10: 5
PAINLEVEL_OUTOF10: 0
PAINLEVEL_OUTOF10: 7

## 2019-12-02 ASSESSMENT — PAIN DESCRIPTION - LOCATION
LOCATION: ELBOW

## 2019-12-02 ASSESSMENT — PAIN DESCRIPTION - PAIN TYPE
TYPE: SURGICAL PAIN

## 2019-12-02 ASSESSMENT — PAIN - FUNCTIONAL ASSESSMENT
PAIN_FUNCTIONAL_ASSESSMENT: 0-10
PAIN_FUNCTIONAL_ASSESSMENT: ACTIVITIES ARE NOT PREVENTED

## 2019-12-04 ENCOUNTER — TELEPHONE (OUTPATIENT)
Dept: ORTHOPEDIC SURGERY | Age: 64
End: 2019-12-04

## 2019-12-07 LAB
ANAEROBIC CULTURE: ABNORMAL
CULTURE SURGICAL: ABNORMAL
CULTURE SURGICAL: ABNORMAL
GRAM STAIN RESULT: ABNORMAL
ORGANISM: ABNORMAL
ORGANISM: ABNORMAL

## 2019-12-09 ENCOUNTER — TELEPHONE (OUTPATIENT)
Dept: ORTHOPEDIC SURGERY | Age: 64
End: 2019-12-09

## 2019-12-09 DIAGNOSIS — M79.643 PAIN OF HAND, UNSPECIFIED LATERALITY: Primary | ICD-10-CM

## 2019-12-09 RX ORDER — SULFAMETHOXAZOLE AND TRIMETHOPRIM 400; 80 MG/1; MG/1
1 TABLET ORAL 2 TIMES DAILY
Qty: 20 TABLET | Refills: 0 | Status: SHIPPED | OUTPATIENT
Start: 2019-12-09 | End: 2019-12-19

## 2019-12-18 ENCOUNTER — OFFICE VISIT (OUTPATIENT)
Dept: ORTHOPEDIC SURGERY | Age: 64
End: 2019-12-18
Payer: MEDICARE

## 2019-12-18 VITALS — HEIGHT: 63 IN | WEIGHT: 229.94 LBS | BODY MASS INDEX: 40.74 KG/M2

## 2019-12-18 DIAGNOSIS — R22.32 FOREARM MASS, LEFT: Primary | ICD-10-CM

## 2019-12-18 PROCEDURE — 20605 DRAIN/INJ JOINT/BURSA W/O US: CPT | Performed by: ORTHOPAEDIC SURGERY

## 2019-12-18 PROCEDURE — 99024 POSTOP FOLLOW-UP VISIT: CPT | Performed by: ORTHOPAEDIC SURGERY

## 2019-12-18 RX ORDER — BETAMETHASONE SODIUM PHOSPHATE AND BETAMETHASONE ACETATE 3; 3 MG/ML; MG/ML
12 INJECTION, SUSPENSION INTRA-ARTICULAR; INTRALESIONAL; INTRAMUSCULAR; SOFT TISSUE ONCE
Status: COMPLETED | OUTPATIENT
Start: 2019-12-18 | End: 2019-12-18

## 2019-12-18 RX ORDER — CLINDAMYCIN HYDROCHLORIDE 300 MG/1
300 CAPSULE ORAL 3 TIMES DAILY
Qty: 30 CAPSULE | Refills: 0 | Status: SHIPPED | OUTPATIENT
Start: 2019-12-18 | End: 2019-12-28

## 2019-12-18 RX ADMIN — BETAMETHASONE SODIUM PHOSPHATE AND BETAMETHASONE ACETATE 12 MG: 3; 3 INJECTION, SUSPENSION INTRA-ARTICULAR; INTRALESIONAL; INTRAMUSCULAR; SOFT TISSUE at 10:49

## 2020-01-29 ENCOUNTER — OFFICE VISIT (OUTPATIENT)
Dept: ORTHOPEDIC SURGERY | Age: 65
End: 2020-01-29
Payer: MEDICARE

## 2020-01-29 VITALS — HEIGHT: 63 IN | WEIGHT: 229.94 LBS | BODY MASS INDEX: 40.74 KG/M2

## 2020-01-29 PROCEDURE — 20605 DRAIN/INJ JOINT/BURSA W/O US: CPT | Performed by: ORTHOPAEDIC SURGERY

## 2020-01-29 PROCEDURE — 99024 POSTOP FOLLOW-UP VISIT: CPT | Performed by: ORTHOPAEDIC SURGERY

## 2020-01-29 NOTE — PROGRESS NOTES
Chief Complaint   Patient presents with    Follow-up     Left forearm mass excision sx 12/2/2019       HISTORY OF PRESENT ILLNESS:  Hermila Morse is a 75 y.o.  patient here for repeat dilation after left forearm mass excision 2 months ago. At last visit, we performed a postoperative hematoma aspiration. Patient comes in stating that the swelling has recurred. She denies fevers or drainage or trauma. She has not been wearing any compression. ROS:  ROS neg     Past medical history is reviewed again today, no changes to report    PHYSICAL EXAMINATION:  Patient is alert and pleasant, in no acute distress. Here with her . The affected extremity is examined today. Left arm reveals recurrence of postoperative swelling. The area is squishy. There is no drainage. There is no erythema. There is no warmth. There is painless motion of the elbow, forearm and hand. Intact neurovascular exam left hand. No lymphadenopathy. No sign of DVT. The prior surgical site is fully healed without sign of problem. IMPRESSION AND PLAN: Left forearm mass   We will continue with our current care plan. We discussed a repeat aspiration of the postoperative swelling again, she was in agreement, hopeful to avoid any further surgery. Under sterile conditions and with the use of ethyl chloride spray the area of swelling was aspirated, there was approximately 48 cc of a hematoma-like fluid, no evidence of purulence or tissue. Full decompression of the area. Tolerated well by the patient. Steroid was not placed. Band-Aid was placed followed by a compression sleeve and Ace compression. She will utilize this compression for at least a week and modify her activities, take it easy. If the swelling recurs, we would consider repeat MRI, repeat aspiration, or proceed back to the operating room for a cleanout of the area. These options were outlined. All questions and concerns were addressed today.   Patient is in

## 2020-03-02 ENCOUNTER — TELEPHONE (OUTPATIENT)
Dept: ORTHOPEDIC SURGERY | Age: 65
End: 2020-03-02

## 2020-03-02 NOTE — TELEPHONE ENCOUNTER
Patient left message asking to have order placed for MRI but I am unclear of which shoulder and why. Davy Roa as we have not seen her since 10/29/2019 and she had a RCR.  Made aware to call back and leave more info or come in for appt and we can discuss

## 2020-03-09 ENCOUNTER — TELEPHONE (OUTPATIENT)
Dept: ORTHOPEDIC SURGERY | Age: 65
End: 2020-03-09

## 2020-03-09 NOTE — TELEPHONE ENCOUNTER
Called & talked to the patient and informed her since we had not seen her since 10/29/2019 she would have to come into the clinic to be re-evaluated, we could not order an MRI without doctor Zoe Malloy evaluating her. Patient understands and she is scheduled to see Doctor Zoe Malloy on 3/10/2020 at 11:40AM at the [facility] location.

## 2020-03-10 ENCOUNTER — OFFICE VISIT (OUTPATIENT)
Dept: ORTHOPEDIC SURGERY | Age: 65
End: 2020-03-10
Payer: MEDICARE

## 2020-03-10 VITALS — HEIGHT: 63 IN | BODY MASS INDEX: 40.74 KG/M2 | WEIGHT: 229.94 LBS

## 2020-03-10 PROCEDURE — G8400 PT W/DXA NO RESULTS DOC: HCPCS | Performed by: ORTHOPAEDIC SURGERY

## 2020-03-10 PROCEDURE — 3017F COLORECTAL CA SCREEN DOC REV: CPT | Performed by: ORTHOPAEDIC SURGERY

## 2020-03-10 PROCEDURE — G8484 FLU IMMUNIZE NO ADMIN: HCPCS | Performed by: ORTHOPAEDIC SURGERY

## 2020-03-10 PROCEDURE — G8427 DOCREV CUR MEDS BY ELIG CLIN: HCPCS | Performed by: ORTHOPAEDIC SURGERY

## 2020-03-10 PROCEDURE — 1036F TOBACCO NON-USER: CPT | Performed by: ORTHOPAEDIC SURGERY

## 2020-03-10 PROCEDURE — G8417 CALC BMI ABV UP PARAM F/U: HCPCS | Performed by: ORTHOPAEDIC SURGERY

## 2020-03-10 PROCEDURE — 1123F ACP DISCUSS/DSCN MKR DOCD: CPT | Performed by: ORTHOPAEDIC SURGERY

## 2020-03-10 PROCEDURE — 99213 OFFICE O/P EST LOW 20 MIN: CPT | Performed by: ORTHOPAEDIC SURGERY

## 2020-03-10 PROCEDURE — 1090F PRES/ABSN URINE INCON ASSESS: CPT | Performed by: ORTHOPAEDIC SURGERY

## 2020-03-10 PROCEDURE — 4040F PNEUMOC VAC/ADMIN/RCVD: CPT | Performed by: ORTHOPAEDIC SURGERY

## 2022-11-02 NOTE — ADDENDUM NOTE
Addended by: Sammy Garduno on: 5/15/2019 08:17 AM     Modules accepted: Orders [facility] GERIATRICS DISCHARGE SUMMARY  PATIENT'S NAME: Reid Jimenes  YOB: 1936  MEDICAL RECORD NUMBER:  [id number]  Place of Service where encounter took place:  [facility]  ([facility]) [[id number]]    PRIMARY CARE PROVIDER AND CLINIC RESPONSIBLE AFTER TRANSFER:   Viet Bingham MD, MD, [address] [facility] [address] / [facility] [address]    [facility] Provider     Transferring providers: SARY Mejia CNP, Andry Phan MD  Recent Hospitalization/ED:  [facility] stay 10/19/22 to 10/21/22.  Date of SNF Admission: October 21, 2022  Date of SNF (anticipated) Discharge: November 04, 2022  Discharged to: [facility] with spouse and hiring private duty home care for overnights  Cognitive Scores: SLUMS 11/30, CPT 4.1/5.6 requires 24 hour supervision for safety  Physical Function: ambulating 200 feet with 2WW CGA, lower body dressing minimal assistance and SBA for upper body dressing.   DME: hospital bed    CODE STATUS/ADVANCE DIRECTIVES DISCUSSION:  Full Code   ALLERGIES: Fentanyl and Versed [midazolam]    NURSING FACILITY COURSE     PMH significant for nonobstructive CAD, type 2 DM, HTN, dyslipidemia, pacemaker placement in 2021 due to high grade conduction system disease, pulmonary HTN, s/p TAVR in 2018, PVD, bilateral carotid artery obstruction, back pain, alcohol dependence in remission     Summary of recent hospitalization:    Reid Jimenes was hospitalized at [facility] from 10/19-10/21/22 for severe back pain and confusion. He presented to the ED for evaluation of acute back pain and confusion. EKG revealed sinus rhythm with sinus arrhythmia with first-degree AV block, left bundle branch block.  Head CT showed no evidence of hemorrhage, small right cerebellar infarcts, presumably chronic. Laboratory evaluation significant for creatinine 1.26.  At home he was taking higher than recommended acetaminophen dosing, but acetaminophen level was negative.   Alcohol level negative. UA negative for infection. He was started on pain management for back pain with tylenol, robaxin, voltaren gel, lidocaine patch, PRN oxycodone. Was started on medrol dose pack on 10/20. Unable to do MARVEL inpatient. Follow up outpatient with Dr. Gomes.  With altered mental status suspected to be secondary to insomnia and pain, this improved while inpatient. He was also started on trazodone inpatient for sleep. Discharged to TCU for physical rehabilitation and medical management    Summary of nursing facility stay:   Patient progressed with therapy while in the TCU. He has cognitive impairment that is dementia level and requires 24 hour supervision for safety. He will continue therapy through home care. He will be returning to hospitals with his wife and they are hiring private duty Home Care for the overnights.     Today he denies SOB, CP, dizziness. Last BM yesterday. He denies dysuria, trouble voiding. No reports of pain this AM.    Specific problems addressed in TCU:   (G93.40) Acute encephalopathy  (R41.0) Delirium  (R41.89) Cognitive impairment  Comment: reportedly improved prior to hospital discharge, however per nursing patient has been wandering and confused in the TCU, with significant episode on 10/30- is doing better now that spouse is staying overnight  -head CT as below showed chronic stroke, unable to obtain MRI due to pacemaker  -infectious work up negative  -SLUMS 11/30, CPT 4.1/5.6 in TCU indicating dementia level impairment and requires 24 hour supervision  Plan: Nursing to assist with cares, meals, medication assistance, activities.    (M54.50) Acute low back pain without sciatica, unspecified back pain laterality   (M47.817) Facet arthropathy, lumbosacral  (M48.07) Lumbosacral spinal stenosis  (M41.9) Scoliosis of lumbar spine, unspecified scoliosis type  (M51.37) DDD (degenerative disc disease), lumbosacral  Comment: follows with Dr. Gomes pain specialist with [facility]  -patient with history of alcohol abuse, so avoid narcotics  -Unable to complete MARVEL inpatient, recommended conservative management prior to injections due to DJD  -completed medrol dose kassandra in TCU  -unable to rate his pain today, but is ongoing  Plan: Continue pain management with Tylenol 975 mg 3 times daily, Voltaren gel 4 times daily, lidocaine patch, methocarbamol 500 mg 4 times daily as needed. Therapy as below.  Follow-up with Dr. Gomes outpatient. Patient requires hospital bed for frequent repositioning to alleviate back pain.     (E11.59) Type 2 diabetes mellitus with other circulatory complication, without long-term current use of insulin (H)  Comment: chronic  -hemoglobin a1c 7.1 on 7/19/22  -BS in -332, majority of BS <200  -sliding scale insulin discontinued in TCU  Plan: Continue metformin  mg BID. Continue BID BS. Continue diabetic diet.     (I25.10) Coronary artery disease involving native coronary artery of native heart without angina pectoris  (I27.20) Pulmonary hypertension (H)  (I10) Essential hypertension  (E78.5) Dyslipidemia  Comment: chronic, with mild nonobstructive CAD per coronary angiogram 6/2021  -not on antihypertensive therapy and BP is controlled 137/70, 153/65, 112/64; HR 82, 84, 70  Plan: Continue baby aspirin, simvastatin 20 mg p.o. at bedtime. Follow-up with cardiology per recommendations.     (Z95.0) Cardiac pacemaker in situ  Comment: placed due to complete heart block  Plan: Continue routine device checks and follow up with cardiology per recommendations     (Z95.2) S/P TAVR (transcatheter aortic valve replacement)  Comment: in 2018  Plan: Follow up with cardiology per recommendations     (I69.30) Chronic CVA (cerebrovascular accident)  Comment: head CT from 10/19/22 showed small right cerebellar infarcts, presumably chronic, these are new since CT head completed in 2021  Plan: Continue baby aspirin, simvastatin 20 mg at bedtime    (G47.00)  Insomnia, unspecified type  Comment: started on trazodone inpatient for insomnia, patient continues to have trouble sleeping in TCU  Plan: Continue trazodone 25 mg at bedtime PRN for sleep.      (F41.9) Anxiety  (G47.00) Insomnia, unspecified type  Comment: started on trazodone inpatient for insomnia, family also requested sertraline be started for anxiety in the TCU  -sertraline started on 10/2722  -has not used trazodone since admission  Plan: Continue sertraline 25 mg po daily, trazodone 25 mg at bedtime PRN for sleep. Monitor mood and symptoms, as well as sleep. Follow up with PCP    (N18.31) CKD stage 3a  Comment: chronic  -baseline creatinine 1.1-1.3  -creatinine 1.26 on 10/31/22  Plan: BMP PRN. Avoid nephrotoxins. Renally dose medications as indicated.    (K59.01) Slow transit constipation  Comment: last BM yesterday per nursing documentation  Plan: Continue senna S 2 tabs po BID. Monitor bowels closely.    (I65.22) Carotid artery calcification, left  Comment: chronic  -Carotid US 8/2021 shows 50-69% diameter stenosis of the left ICA relative to the distal ICA diameter.  Plan: Follow up outpatient.      (F10.21) Alcohol dependence in remission (H)  Comment: chronic, noted  Plan: Continue to encourage cessation.     (R53.81) Physical deconditioning  Comment: Acute, secondary to recent hospitalization, medical conditions as above  -completed course of therapy in TCU  Plan: Continue therapy through home care.       Discharge Medications:  MED REC REQUIRED{  Post Medication Reconciliation Status:  Medication reconciliation previously completed during another office visit       Current Outpatient Medications   Medication Sig Dispense Refill     acetaminophen (TYLENOL) 325 MG tablet Take 3 tablets (975 mg) by mouth 3 times daily MAX dose of tylenol in 24 hours is 3000 mg       ascorbic acid 500 MG TABS Take 500 mg by mouth Takes off and on       aspirin 81 MG EC tablet Take 1 tablet (81 mg) by mouth daily 30 tablet       diclofenac (VOLTAREN) 1 % topical gel Apply 2-4 g topically 4 times daily (Patient taking differently: Apply 4 g topically 4 times daily)       glucosamine-chondroitin 500-400 MG CAPS per capsule Take 1 capsule by mouth Takes off and on       Lidocaine (LIDOCARE) 4 % Patch Place 1-3 patches onto the skin every 24 hours To prevent lidocaine toxicity, patient should be patch free for 12 hrs daily.       metFORMIN (GLUCOPHAGE XR) 500 MG 24 hr tablet Take 1 tablet (500 mg) by mouth 2 times daily (with meals) (Patient taking differently: Take 500 mg by mouth daily) 180 tablet 3     methocarbamol (ROBAXIN) 500 MG tablet Take 1 tablet (500 mg) by mouth 4 times daily as needed for muscle spasms       Multiple Vitamins-Iron (MULTIVITAMIN/IRON PO) Take 1 tablet by mouth Takes off and on       senna-docusate (SENOKOT-S/PERICOLACE) 8.6-50 MG tablet Take 1 tablet by mouth 2 times daily as needed for constipation       SENNA-docusate sodium (SENNA S) 8.6-50 MG tablet Take 2 tablets by mouth 2 times daily       sertraline (ZOLOFT) 25 MG tablet Take 1 tablet (25 mg) by mouth daily       simvastatin (ZOCOR) 20 MG tablet Take 1 tablet (20 mg) by mouth At Bedtime 90 tablet 1     traZODone (DESYREL) 50 MG tablet Take 0.5 tablets (25 mg) by mouth nightly as needed for sleep 10 tablet 0     blood glucose (MIRA CONTOUR NEXT) test strip Use to test blood sugar one time daily or as directed. 100 strip 11     blood glucose monitoring (MIRA MICROLET) lancets Use to test blood sugar 1 times daily or as directed. 100 each 5      Past Medical History:   Past Medical History:   Diagnosis Date     Aortic valve stenosis, nonrheumatic     TAVR 8/28/18: 26mm Edward Sabino 3 valve     Coronary artery disease     cardiac cath 7/24/18: mild non-obstructive disease     High degree atrioventricular block 06/09/2021    DDD PM 6/10/2021     Hyperlipidaemia LDL goal < 100      Hypertension      Need for SBE (subacute bacterial endocarditis)  "prophylaxis      Pulmonary hypertension (H)      Type 2 diabetes mellitus (H)      Physical Exam:   Vitals: BP (!) 153/65   Pulse 84   Temp 97.3  F (36.3  C)   Resp 18   Ht 1.651 m (5' 5\")   Wt 74.4 kg (164 lb)   SpO2 94%   BMI 27.29 kg/m    BMI: Body mass index is 27.29 kg/m .  GENERAL APPEARANCE:  Alert, in NAD  HEENT: normocephalic, moist mucous membranes, nose without drainage or crusting  RESP:  respiratory effort normal, no respiratory distress, Lung sounds clear, patient is on RA  CV: auscultation of heart done, rate and rhythm regular.   ABDOMEN: + bowel sounds, soft, nontender, no grimacing or guarding with palpation.  M/S: no lower extremity swelling  NEURO: cranial nerves 2-12 grossly intact and at patient's baseline; moves extremities freely  PSYCH:  insight and judgement impaired, memory impaired, affect and mood ok      SNF labs: Labs done in SNF are in [facility] Please refer to them using [facility] Everywhere. and Recent labs in EPIC reviewed by me today.     DISCHARGE PLAN:    Medical Follow Up:     Follow up with primary care provider in 1 week  Follow up with specialists per recommendations     Current [facility] scheduled appointments:  Appointments in Next Year    Nov 09, 2022  3:45 PM  (Arrive by 3:30 PM)  Pain Center 30 with Lluvia Gomes MD, [id number]  [facility] [place] ([facility] ) [phone]   Duane 10, 2023  8:30 AM  LAB with [facility] LAB  [facility] ([facility] ) [phone]   Jan 11, 2023 12:00 AM  CARDIAC DEVICE CHECK - REMOTE with HOSKINS TECH1  [facility] ([facility] ) [phone]   Jan 17, 2023 10:30 AM  (Arrive by 10:10 AM)  Provider Visit with Viet Bingham MD  [facility] ([facility] ) [phone]          Discharge Services: Home " Care:  Occupational Therapy, Physical Therapy, Registered Nurse, Home Health Aide,  and From:  [facility]    Discharge Instructions:     Monitor blood glucose monitoring 2 times a day. Keep a record and bring it to your next primary provider visit.     Continue to follow your diet:  Carbohydrate Controlled Diet.     Glucerna nutritional supplement recommended one time a day.     24 hour supervision for safety     TOTAL DISCHARGE TIME:   Greater than 30 minutes  Electronically signed by:  SARY Mejia CNP     Home care Face to Face documentation done in [facility] attached to Home care orders for [facility]  Face to Face and Medical Necessity Statement for DME Provider visit    Patient: Reid Jimenes  Gender: male  YOB: 1936  [facility] Record Number: [id number]  Demographics:  [address] [place] [address]  [phone] (home)   Social Security Number: [id number]  Primary Care Provider: Viet Bingham  Insurance: Payor: MEDICARE / Plan: MEDICARE / Product Type: Medicare /     HPI: Reid Jimenes is a 86 year old (2/14/1936), who is being seen today for a face to face provider visit at [facility]  ([facility]) [[id number]]. Medical necessity statement for DME included.     This patient requires the following: DME Ordered and Medical Necessity Statement   Hospital bed: fully electronic with 2 side rails  The patient does  require positioning of the body in ways not feasible with an ordinary bed due to a medical condition that is expected to last at least 1 month due to back pain.   The patient does  require, for the alleviation of pain, postioning of the body in ways not feasible with an ordinary bed.   The patient does not require the head of bed elevated more than 30* most of the time due to CHF, chronic pulmonary disease or aspiration.  The patient does not require traction that can only be  attached to a hospital bed.  The patient does not require a bed height different than a fixed height hospital bed to permit tranfers to wheelchair or standing position.   The patient does  require frequent or immediate changes in body position due to back pain.     Pt needing above DME with expected length of need of 99 due to medical necessity associated with following diagnosis:     Acute encephalopathy  Delirium  Cognitive impairment  Acute low back pain without sciatica, unspecified back pain laterality  Facet arthropathy, lumbosacral  Lumbosacral spinal stenosis  Scoliosis of lumbar spine, unspecified scoliosis type  DDD (degenerative disc disease), lumbosacral  Type 2 diabetes mellitus with other circulatory complication, without long-term current use of insulin (H)  Coronary artery disease involving native coronary artery of native heart without angina pectoris  Pulmonary hypertension (H)  Essential hypertension  Dyslipidemia  Cardiac pacemaker in situ  S/P TAVR (transcatheter aortic valve replacement)  Chronic cerebrovascular accident (CVA)  Anxiety  Insomnia, unspecified type  Stage 3a chronic kidney disease (H)  Slow transit constipation  Alcohol dependence in remission (H)  Carotid artery calcification, left  Physical deconditioning        ELECTRONICALLY SIGNED BY YUMI CERTIFIED PROVIDER: SARY Mejia CNP   NPI: [id number]  [facility] GERIATRICS  [address] [place] [address]

## 2024-03-07 ENCOUNTER — OFFICE VISIT (OUTPATIENT)
Dept: PULMONOLOGY | Age: 69
End: 2024-03-07

## 2024-03-07 VITALS
HEART RATE: 61 BPM | HEIGHT: 63 IN | WEIGHT: 235 LBS | BODY MASS INDEX: 41.64 KG/M2 | RESPIRATION RATE: 12 BRPM | OXYGEN SATURATION: 92 % | DIASTOLIC BLOOD PRESSURE: 76 MMHG | SYSTOLIC BLOOD PRESSURE: 116 MMHG

## 2024-03-07 DIAGNOSIS — D86.0 PULMONARY SARCOIDOSIS (HCC): Primary | ICD-10-CM

## 2024-03-07 RX ORDER — FLUTICASONE FUROATE, UMECLIDINIUM BROMIDE AND VILANTEROL TRIFENATATE 200; 62.5; 25 UG/1; UG/1; UG/1
1 POWDER RESPIRATORY (INHALATION) DAILY
Qty: 1 EACH | Refills: 0 | Status: SHIPPED | COMMUNITY
Start: 2024-03-07

## 2024-03-07 NOTE — PROGRESS NOTES
CC: sarcoid  HPI I  shortness of breath in chest, moderate: worsened over last year.  Concerned about sarcoid.  Minimal associated cough.  Has chest pain radiation across back similar to with sarcoid. Notices that albuterol inhaler helps somewhat      reports that she has never smoked. She has never used smokeless tobacco.    Objective:   Physical Exam  Blood pressure 116/76, pulse 61, resp. rate 12, height 1.6 m (5' 3\"), weight 106.6 kg (235 lb), last menstrual period 05/05/2006, SpO2 92 %. RA  Constitutional:  No acute distress.   HENT:  Oropharynx is clear and moist.   Eyes: Pupils equal, round, and reactive to light. No scleral icterus.   Neck: No tracheal deviation present.   Cardiovascular: Normal heart sounds.  ++ lower extremity edema.  Pulmonary/Chest: No wheezes. No rhonchi. No rales. No decreased breath sounds.  No accessory muscle usage or stridor.   Musculoskeletal: No cyanosis. No clubbing.  Skin: Skin is warm and dry.   Psychiatric: Normal mood and affect.       PFT Dec 2009  FEV1 2.62 L 116% TLC 5.17 L 108% DLCO 23.91 125%   @ [facility]  PFT NOv 2010 FEV12.18 L 97% TLC 4.88 L 103% DLCO 16.36 86%  PFT May 2011  FEV1 is 2.62 L, 117% TLC 5.41 L 114%        DLCO 22.39  118%   PFT Aug 2012  FEV1 2.57 L 102% TLC 5.32 L 105% DLCO 21.38 94%  PFT 9/17/19   FEV1 2.28 L 98% TLC 4.36 L 88% DLCO 16.05 73%     HRCT 10/11/19  Mediastinum: The heart is enlarged, stable.  The central pulmonary arteries  and visualized aorta are within normal limits in caliber and course.  Some  coronary arterial calcifications are present.  There is a small amount of  pericardial fluid measuring up to 1 cm in thickness in the posterior left  pericardial sac.  There are few small mediastinal and left hilar lymph nodes.  No mediastinal adenopathy.   HRCT Findings/Lungs/pleura: The central airways are patent.  There are a few  tiny noncalcified pulmonary nodules measuring up to 4 mm which are unchanged  since 2015 and considered benign.

## 2024-03-07 NOTE — PATIENT INSTRUCTIONS
Stop Carvedilol for 2 weeks and see if breathing is any better.    IF YES, then don't restart but let Chava know    If NO, then try Trelegy 200 one inhalation once daily for 2 weeks. It is still okay to use albuterol and I would like to use albuterol on at least 4 or 5 occasions to see if it helps with the chest tightnes/pain and breathing.     If trelegy helps, let Chava know.      If nothing is helping, we will get a PFT and HRCT --     IF YOU ARE FEELING BETTER, cancel the HRCT and PFT, but keep f/U

## 2024-04-24 ENCOUNTER — HOSPITAL ENCOUNTER (OUTPATIENT)
Dept: PULMONOLOGY | Age: 69
Discharge: HOME | End: 2024-04-24
Payer: MEDICARE

## 2024-04-24 ENCOUNTER — HOSPITAL ENCOUNTER (OUTPATIENT)
Dept: CT IMAGING | Age: 69
Discharge: HOME | End: 2024-04-24
Payer: MEDICARE

## 2024-04-24 VITALS — OXYGEN SATURATION: 97 %

## 2024-04-24 DIAGNOSIS — D86.0 PULMONARY SARCOIDOSIS (HCC): ICD-10-CM

## 2024-04-24 LAB
DLCO %PRED: 77 %
DLCO PRED: NORMAL
DLCO/VA %PRED: NORMAL
DLCO/VA PRED: NORMAL
DLCO/VA: NORMAL
DLCO: NORMAL
EXPIRATORY TIME-POST: NORMAL
EXPIRATORY TIME: NORMAL
FEF 25-75 %CHNG: NORMAL
FEF 25-75 POST %PRED: NORMAL
FEF 25-75% %PRED-PRE: NORMAL
FEF 25-75% PRED: NORMAL
FEF 25-75-POST: NORMAL
FEF 25-75-PRE: NORMAL
FEV1 %PRED-POST: NORMAL
FEV1 %PRED-PRE: 100 %
FEV1 PRED: NORMAL
FEV1-POST: NORMAL
FEV1-PRE: NORMAL
FEV1/FVC %PRED-POST: NORMAL
FEV1/FVC %PRED-PRE: NORMAL
FEV1/FVC PRED: NORMAL
FEV1/FVC-POST: NORMAL
FEV1/FVC-PRE: 75 %
FVC %PRED-POST: NORMAL
FVC %PRED-PRE: NORMAL
FVC PRED: NORMAL
FVC-POST: NORMAL
FVC-PRE: NORMAL
GAW %PRED: NORMAL
GAW PRED: NORMAL
GAW: NORMAL
IC PRE %PRED: NORMAL
IC PRED: NORMAL
IC: NORMAL
MEP: NORMAL
MIP: NORMAL
MVV %PRED-PRE: NORMAL
MVV PRED: NORMAL
MVV-PRE: NORMAL
PEF %PRED-POST: NORMAL
PEF %PRED-PRE: NORMAL
PEF PRED: NORMAL
PEF%CHNG: NORMAL
PEF-POST: NORMAL
PEF-PRE: NORMAL
RAW %PRED: NORMAL
RAW PRED: NORMAL
RAW: NORMAL
RV PRE %PRED: NORMAL
RV PRED: NORMAL
RV: NORMAL
SVC %PRED: NORMAL
SVC PRED: NORMAL
SVC: NORMAL
TLC PRE %PRED: 103 %
TLC PRED: NORMAL
TLC: NORMAL
VA %PRED: NORMAL
VA PRED: NORMAL
VA: NORMAL
VTG %PRED: NORMAL
VTG PRED: NORMAL
VTG: NORMAL

## 2024-04-24 PROCEDURE — 94726 PLETHYSMOGRAPHY LUNG VOLUMES: CPT

## 2024-04-24 PROCEDURE — 94760 N-INVAS EAR/PLS OXIMETRY 1: CPT

## 2024-04-24 PROCEDURE — 71250 CT THORAX DX C-: CPT

## 2024-04-24 PROCEDURE — 94729 DIFFUSING CAPACITY: CPT

## 2024-04-24 PROCEDURE — 94010 BREATHING CAPACITY TEST: CPT

## 2024-04-24 ASSESSMENT — PULMONARY FUNCTION TESTS
FEV1_PERCENT_PREDICTED_PRE: 100
FEV1/FVC_PRE: 75

## 2024-04-25 PROCEDURE — 94726 PLETHYSMOGRAPHY LUNG VOLUMES: CPT | Performed by: INTERNAL MEDICINE

## 2024-04-25 PROCEDURE — 94060 EVALUATION OF WHEEZING: CPT | Performed by: INTERNAL MEDICINE

## 2024-04-25 PROCEDURE — 94729 DIFFUSING CAPACITY: CPT | Performed by: INTERNAL MEDICINE

## 2024-04-25 NOTE — PROCEDURES
[address] [address]                           PULMONARY FUNCTION      PATIENT NAME: DONALD MARTINEZ              : 1955  MED REC NO: [id number]                      ROOM:   ACCOUNT NO: [id number]                       ADMIT DATE: 2024  PROVIDER: Dexter Mensah MD      DATE OF PROCEDURE:  2024    PROCEDURE:  Pulmonary function test.    INDICATION:  Sarcoidosis.    FINDINGS:    1. Spirometry revealed no evidence of obstructive defect.  FEV1 is 2.16 L, which is 100% predicted.  FEV1/FVC ratio is 75%.  2. Lung volumes reveal normal total lung capacity of 5.07 L, which is 103% predicted.  3. Diffusion capacity is mildly decreased at 17.43, which is 77% predicted.  4. Flow volume loops appear to be normal.    CONCLUSIONS:    1. No evidence of obstructive defect.  2. No evidence of restrictive defect.  3. Isolated mildly decreased diffusion capacity that could be seen in sarcoidosis.  Clinical correlation is warranted.          DEXTER MENSAH MD      D:  2024 17:44:14     T:  2024 15:06:32     /SIVA  Job #:  [id number]     Doc#:  [id number]

## 2024-04-30 ENCOUNTER — OFFICE VISIT (OUTPATIENT)
Dept: PULMONOLOGY | Age: 69
End: 2024-04-30
Payer: MEDICARE

## 2024-04-30 VITALS
WEIGHT: 238 LBS | BODY MASS INDEX: 42.17 KG/M2 | RESPIRATION RATE: 12 BRPM | SYSTOLIC BLOOD PRESSURE: 116 MMHG | HEART RATE: 62 BPM | OXYGEN SATURATION: 93 % | DIASTOLIC BLOOD PRESSURE: 78 MMHG | HEIGHT: 63 IN

## 2024-04-30 DIAGNOSIS — R06.00 DYSPNEA, UNSPECIFIED TYPE: Primary | ICD-10-CM

## 2024-04-30 DIAGNOSIS — D86.0 PULMONARY SARCOIDOSIS (HCC): ICD-10-CM

## 2024-04-30 PROCEDURE — 1036F TOBACCO NON-USER: CPT | Performed by: INTERNAL MEDICINE

## 2024-04-30 PROCEDURE — 1123F ACP DISCUSS/DSCN MKR DOCD: CPT | Performed by: INTERNAL MEDICINE

## 2024-04-30 PROCEDURE — 3017F COLORECTAL CA SCREEN DOC REV: CPT | Performed by: INTERNAL MEDICINE

## 2024-04-30 PROCEDURE — 99214 OFFICE O/P EST MOD 30 MIN: CPT | Performed by: INTERNAL MEDICINE

## 2024-04-30 PROCEDURE — 1090F PRES/ABSN URINE INCON ASSESS: CPT | Performed by: INTERNAL MEDICINE

## 2024-04-30 PROCEDURE — G8427 DOCREV CUR MEDS BY ELIG CLIN: HCPCS | Performed by: INTERNAL MEDICINE

## 2024-04-30 PROCEDURE — G8417 CALC BMI ABV UP PARAM F/U: HCPCS | Performed by: INTERNAL MEDICINE

## 2024-04-30 PROCEDURE — G8400 PT W/DXA NO RESULTS DOC: HCPCS | Performed by: INTERNAL MEDICINE

## 2024-04-30 RX ORDER — GABAPENTIN 800 MG/1
800 TABLET ORAL 2 TIMES DAILY
COMMUNITY
Start: 2024-04-04

## 2024-04-30 RX ORDER — AMLODIPINE BESYLATE 5 MG/1
1 TABLET ORAL DAILY
COMMUNITY
Start: 2023-12-22

## 2024-04-30 RX ORDER — ROSUVASTATIN CALCIUM 10 MG/1
10 TABLET, COATED ORAL
COMMUNITY
Start: 2022-09-26

## 2024-04-30 RX ORDER — MELOXICAM 15 MG/1
TABLET ORAL
COMMUNITY

## 2024-04-30 RX ORDER — FLUTICASONE FUROATE, UMECLIDINIUM BROMIDE AND VILANTEROL TRIFENATATE 200; 62.5; 25 UG/1; UG/1; UG/1
1 POWDER RESPIRATORY (INHALATION) DAILY
Qty: 1 EACH | Refills: 5 | Status: SHIPPED | OUTPATIENT
Start: 2024-04-30

## 2024-04-30 RX ORDER — AMLODIPINE BESYLATE 2.5 MG/1
TABLET ORAL
COMMUNITY
End: 2024-04-30

## 2024-04-30 RX ORDER — TRAMADOL HYDROCHLORIDE 50 MG/1
TABLET ORAL
COMMUNITY

## 2024-04-30 RX ORDER — AMITRIPTYLINE HYDROCHLORIDE 25 MG/1
TABLET, FILM COATED ORAL
COMMUNITY

## 2024-04-30 RX ORDER — VALACYCLOVIR HYDROCHLORIDE 1 G/1
TABLET, FILM COATED ORAL
COMMUNITY

## 2024-04-30 RX ORDER — ONDANSETRON 4 MG/1
TABLET, FILM COATED ORAL
COMMUNITY

## 2024-04-30 NOTE — PATIENT INSTRUCTIONS
Repeat CT CHEST in 12 months    Try using albuterol prior to activity that makes you short of breath.  If albuterol is just as good or better than trelegy, then just use albuterol.  If trelegy results in additional benefit, use trelegy.     Try stopping amlodipine for one week and see if your leg swelling is significant better.  If leg swelling better, then you'll need to let Dr Daly.  If no difference in leg swelling, you can restart amlodipine.    While off amlodipine, check BP daily and let Chava know if your blood pressure is > 155/90

## 2024-04-30 NOTE — PROGRESS NOTES
2/2 Sarcoid - resolved on f/u Chest CT imaging  Mediastinal Lymphadenopathy - resolved  Adverse reaction to plaquenil  Seen by Dr. Nguyễn remotely        Plan:   Off coreg   Continue trelegy 200, trial albuterol prior to exercise, if albuterol is just as good can probably drop trelegy   CT CHEST in 12 months for PN and see pulmonary after   Ophthalmology - Francesco Hayes - had cataract surgery in April

## 2024-06-15 ENCOUNTER — TELEPHONE (OUTPATIENT)
Dept: PULMONOLOGY | Age: 69
End: 2024-06-15

## 2024-07-03 ENCOUNTER — TELEPHONE (OUTPATIENT)
Dept: PULMONOLOGY | Age: 69
End: 2024-07-03

## 2024-07-12 ENCOUNTER — TELEPHONE (OUTPATIENT)
Dept: PULMONOLOGY | Age: 69
End: 2024-07-12

## (undated) DEVICE — Device

## (undated) DEVICE — ALCOHOL RUBBING 16OZ 70% ISO

## (undated) DEVICE — NEEDLE HYPO 18GA L1.5IN THN WALL PIVOTING SHLD BVL ORIENTED

## (undated) DEVICE — GLOVE SURG SZ 7 L12IN FNGR THK94MIL STD WHT ISOLEX LTX FREE

## (undated) DEVICE — SOLUTION IV IRRIG WATER 1000ML POUR BRL 2F7114

## (undated) DEVICE — 3M™ MEDIPORE™ SOFT CLOTH TAPE, 4 INCH X 10 YARDS, 12 ROLLS/CASE, 2964: Brand: 3M™ MEDIPORE™

## (undated) DEVICE — STERILE LATEX POWDER-FREE SURGICAL GLOVESWITH NITRILE COATING: Brand: PROTEXIS

## (undated) DEVICE — KIT ACL ANT CRUCE LIG CANN RUL PEN DRL PIN GWIRE RETRCT

## (undated) DEVICE — BLADE SHV L13CM DIA4MM EXCALIBUR AGG COOLCUT

## (undated) DEVICE — SUTURE ETHLN SZ 3-0 L18IN NONABSORBABLE BLK PS-2 L19MM 3/8 1669H

## (undated) DEVICE — CANNULA NSL 13FT TUBE AD ETCO2 DIV SAMP M

## (undated) DEVICE — STERILE POLYISOPRENE POWDER-FREE SURGICAL GLOVES: Brand: PROTEXIS

## (undated) DEVICE — CHLORAPREP 26ML ORANGE

## (undated) DEVICE — CANNULA ARTHSCP THRD 55X75 MM W/ OBTURATOR CLR

## (undated) DEVICE — BLADE SHV L13CM DIA5.5MM BNE CUT AGG DEB COOLCUT

## (undated) DEVICE — NEEDLE SPNL 18GA L3IN S STL REG WALL FIT STYL PNK HUB W/

## (undated) DEVICE — 1010 S-DRAPE TOWEL DRAPE 10/BX: Brand: STERI-DRAPE™

## (undated) DEVICE — PAD FELT FOR ARTHROVAC FLR SUCT SYS

## (undated) DEVICE — SHUTTLE SUT 90DEG UP CHIA IDEAL

## (undated) DEVICE — MICROSURGICAL INSTRUMENT ANTERIOR CHAMBER CANNULA 30GA: Brand: ALCON

## (undated) DEVICE — UNIVERSAL BLOCK TRAY: Brand: MEDLINE INDUSTRIES, INC.

## (undated) DEVICE — SOLUTION IV IRRIG 500ML 0.9% SODIUM CHL 2F7123

## (undated) DEVICE — MEDI-VAC NON-CONDUCTIVE SUCTION TUBING: Brand: CARDINAL HEALTH

## (undated) DEVICE — DRAPE 70X60IN SPLIT IMPERV ADHES STRIP

## (undated) DEVICE — SMARTGOWN SURGICAL GOWN, XL: Brand: CONVERTORS

## (undated) DEVICE — MAT TRACK 40 X 72 IN ABSORBENT

## (undated) DEVICE — TUBING PMP L16FT MAIN DISP FOR AR-6400 AR-6475

## (undated) DEVICE — TOWEL OR BLUEE 16X26IN ST 8 PACK ORB08 16X26ORTWL

## (undated) DEVICE — GAUZE,SPONGE,4"X4",16PLY,STRL,LF,10/TRAY: Brand: MEDLINE

## (undated) DEVICE — GLOVE ORANGE PI 7 1/2   MSG9075

## (undated) DEVICE — 3M™ DURAPORE™ SURGICAL TAPE 1538-3, 3 INCH X 10 YARD (7,5CM X 9,1M), 4 ROLLS/BOX: Brand: 3M™ DURAPORE™

## (undated) DEVICE — AMIENT MEGAVAC 90 WAND: Brand: COBLATION

## (undated) DEVICE — VISCOAT OPTH SOLN 0.5ML VISCOELASTIC

## (undated) DEVICE — SUTURE ETHLN SZ 4-0 L18IN NONABSORBABLE BLK L19MM PS-2 3/8 1667H

## (undated) DEVICE — SHOULDER STABILIZATION KIT,                                    DISPOSABLE 12 PER BOX

## (undated) DEVICE — SUTURE VCRL SZ 2-0 L27IN ABSRB UD L26MM SH 1/2 CIR J417H

## (undated) DEVICE — CONTROL SYRINGE LUER-LOCK TIP: Brand: MONOJECT

## (undated) DEVICE — TUBING PMP L6FT CONT WAVE EXTN

## (undated) DEVICE — NEEDLE HYPO 22GA L1.5IN BLK POLYPR HUB S STL REG BVL STR

## (undated) DEVICE — NEEDLE SUT FOR EXPRESSEW LL AND LLL SUT PASS EXPRESSEW LLL

## (undated) DEVICE — SURGICAL PROCEDURE PACK EYE CLERMONT

## (undated) DEVICE — Z CONVERTED USE 2273163 BANDAGE COMPR W3INXL4.5YD LTWT E EC SGL LAYERED CLP CLSR

## (undated) DEVICE — PADDING CAST W4INXL4YD NONSTERILE COT RAYON MICROPLEATED

## (undated) DEVICE — SOLUTION,SALINE,IRRGATION,500ML,STRL: Brand: MEDLINE

## (undated) DEVICE — PLATE RETEN AUTOCAPTURE + LD TOOL USED W E3AC+ FLX SUT PASS